# Patient Record
Sex: MALE | Race: BLACK OR AFRICAN AMERICAN | NOT HISPANIC OR LATINO | Employment: FULL TIME | URBAN - METROPOLITAN AREA
[De-identification: names, ages, dates, MRNs, and addresses within clinical notes are randomized per-mention and may not be internally consistent; named-entity substitution may affect disease eponyms.]

---

## 2018-07-25 ENCOUNTER — OFFICE VISIT (OUTPATIENT)
Dept: URGENT CARE | Facility: URGENT CARE | Age: 23
End: 2018-07-25
Payer: MEDICAID

## 2018-07-25 ENCOUNTER — HOSPITAL ENCOUNTER (EMERGENCY)
Facility: CLINIC | Age: 23
Discharge: LEFT BEFORE DISCHARGE | End: 2018-07-25
Payer: MEDICAID

## 2018-07-25 ENCOUNTER — RADIANT APPOINTMENT (OUTPATIENT)
Dept: GENERAL RADIOLOGY | Facility: CLINIC | Age: 23
End: 2018-07-25
Attending: FAMILY MEDICINE
Payer: MEDICAID

## 2018-07-25 VITALS
HEART RATE: 55 BPM | DIASTOLIC BLOOD PRESSURE: 78 MMHG | OXYGEN SATURATION: 99 % | TEMPERATURE: 98.2 F | SYSTOLIC BLOOD PRESSURE: 130 MMHG

## 2018-07-25 DIAGNOSIS — S59.902A ELBOW INJURY, LEFT, INITIAL ENCOUNTER: Primary | ICD-10-CM

## 2018-07-25 DIAGNOSIS — S52.125A CLOSED NONDISPLACED FRACTURE OF HEAD OF LEFT RADIUS, INITIAL ENCOUNTER: ICD-10-CM

## 2018-07-25 PROCEDURE — 99203 OFFICE O/P NEW LOW 30 MIN: CPT | Performed by: FAMILY MEDICINE

## 2018-07-25 PROCEDURE — 73080 X-RAY EXAM OF ELBOW: CPT | Mod: LT

## 2018-07-25 NOTE — PROGRESS NOTES
SUBJECTIVE:  Chief Complaint   Patient presents with     Urgent Care     Elbow left     left elbow injury on monday,pain getting worse     John Monteiro is a 22 year old male presents with a chief complaint of left elbow pain, tenderness and decreased range of motion.  The injury occurred 2 day(s) ago.   The injury happened while skateboarding. How: fall immediate pain, delayed pain.  The patient complained of moderate pain  and has had decreased ROM.  Pain exacerbated by flexion/extension.  Relieved by rest.  He treated it initially with ice, Tylenol and Ibuprofen. This is not the first time this type of injury has occurred to this patient.     Past Medical History:   Diagnosis Date     Appendicitis Sep 19, 2009     Pneumonia April 16, 2009     Current Outpatient Prescriptions   Medication Sig Dispense Refill     cholecalciferol (VITAMIN D3) 5000 UNITS CAPS capsule Take 1 capsule by mouth daily. 30 capsule 11     LANsoprazole (PREVACID) 15 MG capsule Take 1 capsule by mouth daily. Take 30-60 minutes before a meal. (Patient not taking: Reported on 7/25/2018) 30 capsule 1     NO ACTIVE MEDICATIONS         Social History   Substance Use Topics     Smoking status: Never Smoker     Smokeless tobacco: Never Used     Alcohol use No       ROS:  Review of systems negative except as stated above.    EXAM:   /78  Pulse 55  Temp 98.2  F (36.8  C) (Oral)  SpO2 99%  Gen: healthy,alert,no distress  Extremity: elbow has swelling, point tenderness on the olecranon , decreased ROM and pain with flexion and extension, supination and pronation   There is not compromise to the distal circulation.  Pulses are +2 and CRT is brisk  GENERAL APPEARANCE: healthy, alert and no distress  EXTREMITIES: peripheral pulses normal  SKIN: no suspicious lesions or rashes  NEURO: Normal strength and tone, sensory exam grossly normal, mentation intact and speech normal    X-RAY was done.    Results for orders placed or performed in visit on  07/25/18   XR Elbow Left G/E 3 Views    Narrative    XR ELBOW LT G/E 3 VW   7/25/2018 6:16 PM     HISTORY: fell on elbow 2 days back , acute pain with flexion  extension, supination and pronation , tenderness over the olecranon;  Elbow injury, left, initial encounter    COMPARISON: None.      Impression    IMPRESSION: There is a large joint effusion. On the oblique view would  be difficult to exclude a tiny fracture at the radial head neck  junction but this could also just be a vascular channel. No other  definite fractures identified. The joint effusion suggests an occult  fracture. This should be assumed to be a fracture until proven  otherwise. Follow-up x-ray in 7-10 days is recommended. Alternatively  MRI could be performed.    RICHY HOLLEY MD     ASSESSMENT:   John was seen today for urgent care and elbow left.    Diagnoses and all orders for this visit:    Elbow injury, left, initial encounter  -     Cancel: XR Elbow Left 2 Views  -     XR Elbow Left G/E 3 Views    Closed nondisplaced fracture of head of left radius, initial encounter      PLAN:  1) Ibuprofen q 6 hrs for 3-5 days, Ortho referral   Rest ice elevate  Elbow was put in a sling and then he was advised to follow up with ortho referral

## 2018-07-25 NOTE — MR AVS SNAPSHOT
After Visit Summary   7/25/2018    John Monteiro    MRN: [id number]           Patient Information     Date Of Birth          1995        Visit Information        Provider Department      7/25/2018 5:35 PM Lupis Dickey MD [facility]        Today's Diagnoses     Elbow injury, left, initial encounter    -  1    Closed nondisplaced fracture of head of left radius, initial encounter          Care Instructions      Elbow Fracture    You have a break (fracture) of one or more bones of your elbow joint. This may be a small crack in the bone. Or it may be a major break, with the broken parts pushed out of position.  This fracture usually takes 4 to 12 weeks to heal, depending on the type. The first step in treatment is with a splint or cast. Severe fractures may need surgery to put the bone fragments back into place.  Home care  Follow these guidelines when caring for yourself at home:    Keep your arm elevated to reduce pain and swelling. When sitting or lying down keep your arm above the level of your heart. You can do this by placing your arm on a pillow that rests on your chest or on a pillow at your side. This is most important during the first 2 days (48 hours) after the injury.    Put an ice pack on the injured area. Do this for 20 minutes every 1 to 2 hours the first day. You can make an ice pack by wrapping a plastic bag of ice cubes in a thin towel. As the ice melts, be careful that the cast or splint doesn t get wet. You can place the ice pack inside the sling and directly over the splint or cast. Continue to use the ice pack 3 to 4 times a day for the next 2 days. Then use the ice pack as needed to ease pain and swelling.    Keep the splint or cast completely dry at all times. Bathe with your splint or cast out of the water. Protect it with a large plastic bag, rubber-banded at the top end. If a fiberglass splint or cast gets wet, you can dry it with a hair  dryer.    You may use acetaminophen or ibuprofen to control pain, unless another pain medicine was prescribed. If you have chronic liver or kidney disease, talk with your healthcare provider before using these medicines. Also talk with your provider if you ve had a stomach ulcer or gastrointestinal bleeding.    Don t put creams or objects under the cast if you have itching.  Follow-up care  Follow up with your healthcare provider in 1 week, or as advised. This is to make sure the bone is healing the way it should. If a splint was put on, it may be changed to a cast during your follow-up visit.  X-rays may be taken. You will be told of any new findings that may affect your care.  When to seek medical advice  Call your healthcare provider right away if any of these occur:    The cast or splint cracks    The plaster cast or splint becomes wet or soft    The fiberglass cast or splint stays wet for more than 24 hours    Tightness or pain under the cast or splint gets worse    Bad odor from the cast or wound fluid stains the cast    Fingers become swollen, cold, blue, numb, or tingly    You can t move your fingers    Skin around cast becomes red    Fever of 100.4 F (38 C) or higher, or as directed by your healthcare provider   Date Last Reviewed: 2/6/2017 2000-2017 [employer]. [address] [place] [address], [place] [address]. All rights reserved. This information is not intended as a substitute for professional medical care. Always follow your healthcare professional's instructions.                Follow-ups after your visit        Who to contact     If you have questions or need follow up information about today's clinic visit or your schedule please contact [facility] directly at [phone].  Normal or non-critical lab and imaging results will be communicated to you by MyChart, letter or phone within 4 business days after the clinic has received the results. If you do not hear from  "us within 7 days, please contact the clinic through [email or web address] or phone. If you have a critical or abnormal lab result, we will notify you by phone as soon as possible.  Submit refill requests through [email or web address] or call your pharmacy and they will forward the refill request to us. Please allow 3 business days for your refill to be completed.          Additional Information About Your Visit        [employer] Information     [email or web address] lets you send messages to your doctor, view your test results, renew your prescriptions, schedule appointments and more. To sign up, go to [email or web address] . Click on \"Log in\" on the left side of the screen, which will take you to the Welcome page. Then click on \"Sign up Now\" on the right side of the page.     You will be asked to enter the access code listed below, as well as some personal information. Please follow the directions to create your username and password.     Your access code is: [email or web address]  Expires: 10/23/2018  6:42 PM     Your access code will  in 90 days. If you need help or a new code, please call your [facility] clinic or [phone].        Care EveryWhere ID     This is your Care EveryWhere ID. This could be used by other organizations to access your [facility] medical records  [id number]        Your Vitals Were     Pulse Temperature Pulse Oximetry             55 98.2  F (36.8  C) (Oral) 99%          Blood Pressure from Last 3 Encounters:   18 130/78   14 145/80   14 104/63    Weight from Last 3 Encounters:   14 183 lb (83 kg) (89 %)*   14 189 lb 6.4 oz (85.9 kg) (92 %)*   13 180 lb (81.6 kg) (92 %)*     * Growth percentiles are based on CDC 2-20 Years data.              We Performed the Following     XR Elbow Left G/E 3 Views        Primary Care Provider Office Phone # Fax #    [facility] [phone] [address] [facility] [address]        Equal Access to Services     " JOAN REDDING : Hadii aad ku cyndi [place], waaxda luqadaha, qaybta kaalmada carrillosarkisanjelica, waxyanelis deny willisjellytatiana hein . So [facility] [phone].    ATENCIÓN: Si habla español, tiene a lind disposición servicios gratuitos de asistencia lingüística. Llame al [phone].    We comply with applicable federal civil rights laws and [place] laws. We do not discriminate on the basis of race, color, national origin, age, disability, sex, sexual orientation, or gender identity.            Thank you!     Thank you for choosing [facility]  for your care. Our goal is always to provide you with excellent care. Hearing back from our patients is one way we can continue to improve our services. Please take a few minutes to complete the written survey that you may receive in the mail after your visit with us. Thank you!             Your Updated Medication List - Protect others around you: Learn how to safely use, store and throw away your medicines at www.disposemymeds.org.          This list is accurate as of 7/25/18  6:42 PM.  Always use your most recent med list.                   Brand Name Dispense Instructions for use Diagnosis    cholecalciferol 5000 units Caps capsule    vitamin D3    30 capsule    Take 1 capsule by mouth daily.    Vitamin D deficiency       LANsoprazole 15 MG CR capsule    PREVACID    30 capsule    Take 1 capsule by mouth daily. Take 30-60 minutes before a meal.    Epigastric pain       NO ACTIVE MEDICATIONS

## 2018-07-25 NOTE — PATIENT INSTRUCTIONS
Elbow Fracture    You have a break (fracture) of one or more bones of your elbow joint. This may be a small crack in the bone. Or it may be a major break, with the broken parts pushed out of position.  This fracture usually takes 4 to 12 weeks to heal, depending on the type. The first step in treatment is with a splint or cast. Severe fractures may need surgery to put the bone fragments back into place.  Home care  Follow these guidelines when caring for yourself at home:    Keep your arm elevated to reduce pain and swelling. When sitting or lying down keep your arm above the level of your heart. You can do this by placing your arm on a pillow that rests on your chest or on a pillow at your side. This is most important during the first 2 days (48 hours) after the injury.    Put an ice pack on the injured area. Do this for 20 minutes every 1 to 2 hours the first day. You can make an ice pack by wrapping a plastic bag of ice cubes in a thin towel. As the ice melts, be careful that the cast or splint doesn t get wet. You can place the ice pack inside the sling and directly over the splint or cast. Continue to use the ice pack 3 to 4 times a day for the next 2 days. Then use the ice pack as needed to ease pain and swelling.    Keep the splint or cast completely dry at all times. Bathe with your splint or cast out of the water. Protect it with a large plastic bag, rubber-banded at the top end. If a fiberglass splint or cast gets wet, you can dry it with a hair dryer.    You may use acetaminophen or ibuprofen to control pain, unless another pain medicine was prescribed. If you have chronic liver or kidney disease, talk with your healthcare provider before using these medicines. Also talk with your provider if you ve had a stomach ulcer or gastrointestinal bleeding.    Don t put creams or objects under the cast if you have itching.  Follow-up care  Follow up with your healthcare provider in 1 week, or as advised. This is  to make sure the bone is healing the way it should. If a splint was put on, it may be changed to a cast during your follow-up visit.  X-rays may be taken. You will be told of any new findings that may affect your care.  When to seek medical advice  Call your healthcare provider right away if any of these occur:    The cast or splint cracks    The plaster cast or splint becomes wet or soft    The fiberglass cast or splint stays wet for more than 24 hours    Tightness or pain under the cast or splint gets worse    Bad odor from the cast or wound fluid stains the cast    Fingers become swollen, cold, blue, numb, or tingly    You can t move your fingers    Skin around cast becomes red    Fever of 100.4 F (38 C) or higher, or as directed by your healthcare provider   Date Last Reviewed: 2/6/2017 2000-2017 [employer]. [address] [place] [address]. All rights reserved. This information is not intended as a substitute for professional medical care. Always follow your healthcare professional's instructions.

## 2020-01-30 ENCOUNTER — HOSPITAL ENCOUNTER (EMERGENCY)
Facility: CLINIC | Age: 25
Discharge: HOME | End: 2020-01-30
Attending: EMERGENCY MEDICINE | Admitting: EMERGENCY MEDICINE
Payer: COMMERCIAL

## 2020-01-30 VITALS
SYSTOLIC BLOOD PRESSURE: 128 MMHG | RESPIRATION RATE: 16 BRPM | DIASTOLIC BLOOD PRESSURE: 70 MMHG | BODY MASS INDEX: 26.79 KG/M2 | WEIGHT: 208.78 LBS | HEART RATE: 91 BPM | OXYGEN SATURATION: 98 % | HEIGHT: 74 IN | TEMPERATURE: 98.6 F

## 2020-01-30 DIAGNOSIS — J20.9 ACUTE BRONCHITIS, UNSPECIFIED ORGANISM: ICD-10-CM

## 2020-01-30 LAB
FLUAV+FLUBV AG SPEC QL: NEGATIVE
FLUAV+FLUBV AG SPEC QL: NEGATIVE
SPECIMEN SOURCE: NORMAL

## 2020-01-30 PROCEDURE — 87804 INFLUENZA ASSAY W/OPTIC: CPT | Performed by: EMERGENCY MEDICINE

## 2020-01-30 PROCEDURE — 99283 EMERGENCY DEPT VISIT LOW MDM: CPT | Performed by: EMERGENCY MEDICINE

## 2020-01-30 PROCEDURE — 99283 EMERGENCY DEPT VISIT LOW MDM: CPT | Mod: Z6 | Performed by: EMERGENCY MEDICINE

## 2020-01-30 RX ORDER — BENZONATATE 100 MG/1
100-200 CAPSULE ORAL 3 TIMES DAILY PRN
Qty: 30 CAPSULE | Refills: 0 | Status: SHIPPED | OUTPATIENT
Start: 2020-01-30 | End: 2022-05-09

## 2020-01-30 ASSESSMENT — MIFFLIN-ST. JEOR: SCORE: 2011.75

## 2020-01-30 NOTE — LETTER
January 30, 2020      To Whom It May Concern:      John Monteiro was seen in our Emergency Department today, 01/30/20.  I expect his condition to improve over the next few days.  He may return to work/school on 2/1/2020.    Sincerely,        Yonny Loco MD, MD

## 2020-01-30 NOTE — ED AVS SNAPSHOT
[facility], [place], Emergency Department  [address] [place] [id number]  Phone:  [phone]                                    John Monteiro   MRN: [id number]    Department:  [facility], Emergency Department   Date of Visit:  1/30/2020           After Visit Summary Signature Page    I have received my discharge instructions, and my questions have been answered. I have discussed any challenges I see with this plan with the nurse or doctor.    ..........................................................................................................................................  Patient/Patient Representative Signature      ..........................................................................................................................................  Patient Representative Print Name and Relationship to Patient    ..................................................               ................................................  Date                                   Time    ..........................................................................................................................................  Reviewed by Signature/Title    ...................................................              ..............................................  Date                                               Time          22EPIC Rev 08/18

## 2020-01-30 NOTE — ED TRIAGE NOTES
Pt arrived via car with c/o fever, cough and nausea x 2 days. Per pt he has not had his flu shot this year. Vitals stable on arrival.

## 2020-01-31 NOTE — ED PROVIDER NOTES
History     Chief Complaint   Patient presents with     Fever     HPI  John Monteiro is a 24 year old male who presents to the ER with complaints of a cough myalgias and fevers over the last 2 days.  Patient states his cough is productive of greenish type sputum.  Patient is a non-smoker he denies any vomiting or diarrhea.  Patient denies any chest pain or shortness of breath.    I have reviewed the Medications, Allergies, Past Medical and Surgical History, and Social History in the [employer] system.  Past Medical History:   Diagnosis Date     Appendicitis Sep 19, 2009     Pneumonia April 16, 2009     Current Outpatient Medications   Medication Sig Dispense Refill     cholecalciferol (VITAMIN D3) 5000 UNITS CAPS capsule Take 1 capsule by mouth daily. 30 capsule 11     LANsoprazole (PREVACID) 15 MG capsule Take 1 capsule by mouth daily. Take 30-60 minutes before a meal. (Patient not taking: Reported on 7/25/2018) 30 capsule 1     NO ACTIVE MEDICATIONS         order for DME Equipment being ordered: left arm sling 1 Device 0     No Known Allergies     Social History     Socioeconomic History     Marital status: Single     Spouse name: Not on file     Number of children: Not on file     Years of education: Not on file     Highest education level: Not on file   Occupational History     Not on file   Social Needs     Financial resource strain: Not on file     Food insecurity:     Worry: Not on file     Inability: Not on file     Transportation needs:     Medical: Not on file     Non-medical: Not on file   Tobacco Use     Smoking status: Never Smoker     Smokeless tobacco: Never Used   Substance and Sexual Activity     Alcohol use: No     Drug use: No     Sexual activity: Never   Lifestyle     Physical activity:     Days per week: Not on file     Minutes per session: Not on file     Stress: Not on file   Relationships     Social connections:     Talks on phone: Not on file     Gets together: Not on file     Attends  "Gnosticist service: Not on file     Active member of club or organization: Not on file     Attends meetings of clubs or organizations: Not on file     Relationship status: Not on file     Intimate partner violence:     Fear of current or ex partner: Not on file     Emotionally abused: Not on file     Physically abused: Not on file     Forced sexual activity: Not on file   Other Topics Concern     Not on file   Social History Narrative     Not on file     Past Surgical History:   Procedure Laterality Date     APPENDECTOMY  Sep 19, 2009    laparoscopic     Family History   Problem Relation Age of Onset     Lipids Mother      Family History Negative Father        Review of Systems   All other systems reviewed and are negative.      Physical Exam   BP: 134/67  Heart Rate: 101  Temp: 98.6  F (37  C)  Resp: 16  Height: 188 cm (6' 2\")  Weight: 94.7 kg (208 lb 12.4 oz)  SpO2: 97 %      Physical Exam  Vitals signs and nursing note reviewed.   HENT:      Head: Atraumatic.      Mouth/Throat:      Pharynx: No oropharyngeal exudate or posterior oropharyngeal erythema.   Eyes:      Extraocular Movements: Extraocular movements intact.      Pupils: Pupils are equal, round, and reactive to light.   Neck:      Musculoskeletal: Neck supple.   Cardiovascular:      Rate and Rhythm: Regular rhythm.      Heart sounds: Normal heart sounds.   Pulmonary:      Breath sounds: Normal breath sounds.   Musculoskeletal:         General: No swelling.   Lymphadenopathy:      Cervical: No cervical adenopathy.   Neurological:      General: No focal deficit present.      Mental Status: He is alert and oriented to person, place, and time.   Psychiatric:         Mood and Affect: Mood normal.         ED Course        Procedures        Results for orders placed or performed during the hospital encounter of 01/30/20 (from the past 24 hour(s))   Influenza A/B antigen   Result Value Ref Range    Influenza A/B Agn Specimen Nares     Influenza A Negative " NEG^Negative    Influenza B Negative NEG^Negative       Labs Ordered and Resulted from Time of ED Arrival Up to the Time of Departure from the ED   INFLUENZA A/B ANTIGEN         Assessments & Plan (with Medical Decision Making)     I have reviewed the nursing notes.    Patient symptoms seem to be mostly viral in etiology and do not require antibiotic treatment at this time.    I have reviewed the findings, diagnosis, plan and need for follow up with the patient.    New Prescriptions    BENZONATATE (TESSALON) 100 MG CAPSULE    Take 1-2 capsules (100-200 mg) by mouth 3 times daily as needed for cough       Final diagnoses:   Acute bronchitis, unspecified organism     Keep hydrated.    Please make an appointment to follow up with Your Primary Care Provider or our [facility] (phone: [phone]) in 5-7 days if not improving.    Return to the ER for worsening.    Patient was given a work note through 2/1/2020.    Routine discharge instructions were given for this diagnosis.    Yonny Loco MD, MD    1/30/2020   [facility], EMERGENCY DEPARTMENT     Yonny Loco MD  01/30/20 0410

## 2020-01-31 NOTE — DISCHARGE INSTRUCTIONS
Keep hydrated.    Please make an appointment to follow up with Your Primary Care Provider or our [facility] (phone: [phone]) in 5-7 days if not improving.    Return to the ER for worsening.

## 2020-09-24 ENCOUNTER — OFFICE VISIT (OUTPATIENT)
Dept: FAMILY MEDICINE | Facility: CLINIC | Age: 25
End: 2020-09-24
Payer: COMMERCIAL

## 2020-09-24 VITALS
SYSTOLIC BLOOD PRESSURE: 129 MMHG | HEART RATE: 74 BPM | DIASTOLIC BLOOD PRESSURE: 88 MMHG | TEMPERATURE: 98.3 F | OXYGEN SATURATION: 99 % | HEIGHT: 74 IN | WEIGHT: 233.4 LBS | RESPIRATION RATE: 18 BRPM | BODY MASS INDEX: 29.95 KG/M2

## 2020-09-24 DIAGNOSIS — J30.2 SEASONAL ALLERGIC RHINITIS, UNSPECIFIED TRIGGER: ICD-10-CM

## 2020-09-24 DIAGNOSIS — F90.9 ATTENTION DEFICIT HYPERACTIVITY DISORDER (ADHD), UNSPECIFIED ADHD TYPE: ICD-10-CM

## 2020-09-24 DIAGNOSIS — Z23 NEED FOR PROPHYLACTIC VACCINATION AND INOCULATION AGAINST INFLUENZA: Primary | ICD-10-CM

## 2020-09-24 DIAGNOSIS — Z00.00 ANNUAL PHYSICAL EXAM: ICD-10-CM

## 2020-09-24 LAB — HIV 1+2 AB+HIV1 P24 AG SERPL QL IA: NONREACTIVE

## 2020-09-24 RX ORDER — CETIRIZINE HYDROCHLORIDE 10 MG/1
10 TABLET ORAL DAILY
Qty: 30 TABLET | Refills: 3 | Status: ON HOLD | OUTPATIENT
Start: 2020-09-24 | End: 2023-11-29

## 2020-09-24 SDOH — HEALTH STABILITY: PHYSICAL HEALTH: ON AVERAGE, HOW MANY DAYS PER WEEK DO YOU ENGAGE IN MODERATE TO STRENUOUS EXERCISE (LIKE A BRISK WALK)?: 1 DAY

## 2020-09-24 ASSESSMENT — MIFFLIN-ST. JEOR: SCORE: 2118.45

## 2020-09-24 NOTE — PATIENT INSTRUCTIONS
Here is the plan from today's visit    You are doing all the appropriate maintenance, eye doctor and dentist. Continue to gently increase your exercise activity, wear the foot support and ice/ibuprofen/rest as needed. Congratulations on your imminent graduation.    1. Need for prophylactic vaccination and inoculation against influenza  Happy to give it today.   - INFLUENZA VACCINE IM > 6 MONTHS VALENT IIV4 [64079]    2. Seasonal allergic rhinitis, unspecified trigger  With your increase in allergy symptom severity, I agree it is reasonable to get tested to see if we can identify triggers to avoid. It may also open up treatment options like allergy shots if we decide to go that route. If you start getting more symptoms in your chest, wheezing, shortness of breath, let me know please.  - ALLERGY/ASTHMA ADULT REFERRAL - INTERNAL; Future  - cetirizine (ZYRTEC) 10 MG tablet; Take 1 tablet (10 mg) by mouth daily  Dispense: 30 tablet; Refill: 3    3. Attention deficit hyperactivity disorder (ADHD), unspecified ADHD type  I'm happy to take over writing for this when the BRAD comes through. If you also sign up for AMDL I can message you when that happens. Otherwise, I'll call you.    Please call or return to clinic if your symptoms don't go away.    Follow up plan  Go to allergy testing, I will get results and we will go from there  Reach out to us with any concerns    Thank you for coming to [facility] today.  Lab Testing:  **If you had lab testing today and your results are reassuring or normal they will be mailed to you or sent through AMDL within 7 days.   **If the lab tests need quick action we will call you with the results.  The phone number we will call with results is # [phone] (home) . If this is not the best number please call our clinic and change the number.  Medication Refills:  If you need any refills please call your pharmacy and they will contact us.   If you need to  your refill at a  new pharmacy, please contact the new pharmacy directly. The new pharmacy will help you get your medications transferred faster.   Scheduling:  If you have any concerns about today's visit or wish to schedule another appointment please call our office during normal business hours [phone] (8-5:00 M-F)  If a referral was made to a [facility] and you don't get a call from central scheduling please call [phone].    Medical Concerns:  If you have urgent medical concerns please call [phone] at any time of the day.    Etta Willis MD

## 2020-09-24 NOTE — PROGRESS NOTES
[facility] visit    Assessment and Plan     John is a 25 year old male who presents with: Establish Care (Routine check up and allergy check); A.D.H.D (Patient would like to discuss ADHD, has been treated for in the past, but is switching care to [facility]. ); and Imm/Inj (Flu Shot)      John was seen today for establish care, a.d.h.d and imm/inj.    Diagnoses and all orders for this visit:    Establishing care  Need for prophylactic vaccination and inoculation against influenza  Patient establishing care. Generally healthy with exceptions below. Has history of ankle sprain last year (not seen in clinic) that occasionally aches with resumption of physical activity after COVID pause. We discussed perhaps a support brace while reconditioning, ibuprofen and ice as needed. He will let us know if this becomes more bothersome. Will double check vaccination from [facility].  -     INFLUENZA VACCINE IM > 6 MONTHS VALENT IIV4 [25753]    Seasonal allergic rhinitis, unspecified trigger  Patient reports having needed an inhaler when a kid, not sure why. Since childhood has had allergic rhinitis, eye crusting, from spring to fall managed with home zyrtec. He reports worsening of his symptoms over the last few years, had an episode of bronchitis that started like his allergies and is noticing more regular chest congestion. His use of zyrtec to control his symptoms has also increased markedly. No wheeze today, conjunctivae clear and no congestion right now. With escalation of symptoms, it is reasonable to have allergy testing to see if he would qualify for allergy shots. PFTs or PRN inhaler not indicated at this time. If his respiratory symptoms markedly worsen he will contact us.  -     Cancel: OFFICE CONSULTATION,LEVEL III  -     cetirizine (ZYRTEC) 10 MG tablet; Take 1 tablet (10 mg) by mouth daily  -     ALLERGY/ASTHMA ADULT REFERRAL - INTERNAL; Future    Attention deficit hyperactivity disorder (ADHD), unspecified ADHD  type  Reports history of ADHD diagnosed in middle school with poor medication tolerance at that time. He reports starting meds two years ago with increasing college demands,  reviewed and patient has single prescriber with no atypical refill pattern. Reports taking when needed for work or school, have requested BRAD. Am content to take over writing for his adderall, 20mg daily prn once records obtained. He reports having at least two weeks supply at home.       No follow-ups on file.    Options for treatment and follow-up care were reviewed with the patient/caregivers. They engaged in the decision making process and verbalized understanding of the options discussed and agreed with the final plan.    There are no discontinued medications.    JEFF Robbins MD PG1      Subjective      History obtained from patient, BRAD pending  Patient speaks English,  was not present for this visit.      John Monteiro is a 25 year old male, who presents with:  Chief Complaint   Patient presents with     [facility]     Routine check up and allergy check     A.D.H.D     Patient would like to discuss ADHD, has been treated for in the past, but is switching care to [facility].      Imm/Inj     Flu Shot     Patient is a fairly healthy 25 year old gentleman presenting to [facility]. He notes chronic seasonal allergies that have been getting a bit worse over the past few years. He remembers using an inhaler when a kid but not exactly why or what, but prior to last year denies any significant respiratory component to his allergies but has had more congestion and persistent coughing due to congesting in his chest in the past two years. He was wondering about allergy testing; reports some allergies inside, and known ragweed, denies food allergies, notes more persistent symptoms not responding as well to zyrtec. He reports being diagnosed with ADHD first in middle school, however he did not tolerate the medication  (ritalin) at that time. Two years ago he re-started medication (Adderall) that he uses when he needs to focus at work or school and notes that this has been very helpful. He also manages his symptoms with eating well and exercising which he has been doing less since COVID. He rolled his ankle a year ago and still has some soreness with increased use, inconsistent, without instability. He has a dentist, an eye doctor, both of whom he sees yearly.    Patient is a new patient to this clinic and so I reviewed and updated the problem, medication and allergy lists, as well as past, surgical, family and social history.      Patient Active Problem List   Diagnosis Code     NO ACTIVE PROBLEMS      Abdominal pain R10.9     Current Outpatient Medications   Medication     benzonatate (TESSALON) 100 MG capsule     cetirizine (ZYRTEC) 10 MG tablet     cholecalciferol (VITAMIN D3) 5000 UNITS CAPS capsule     LANsoprazole (PREVACID) 15 MG capsule     NO ACTIVE MEDICATIONS     order for DME     No current facility-administered medications for this visit.         Allergies   Allergen Reactions     Seasonal Allergies        Past Medical History:   Diagnosis Date     Appendicitis Sep 19, 2009     Pneumonia April 16, 2009     Past Surgical History:   Procedure Laterality Date     APPENDECTOMY  Sep 19, 2009     Family History     Problem (# of Occurrences) Relation (Name,Age of Onset)    Family History Negative (1) Father    Lipids (1) Mother        Social History     Socioeconomic History     Marital status: Single     Spouse name: None     Number of children: None     Years of education: None     Highest education level: None   Occupational History     None   Social Needs     Financial resource strain: None     Food insecurity     Worry: None     Inability: None     Transportation needs     Medical: None     Non-medical: None   Tobacco Use     Smoking status: Never Smoker     Smokeless tobacco: Never Used   Substance and Sexual  "Activity     Alcohol use: No     Drug use: No     Sexual activity: Never   Lifestyle     Physical activity     Days per week: None     Minutes per session: None     Stress: None   Relationships     Social connections     Talks on phone: None     Gets together: None     Attends Church service: None     Active member of club or organization: None     Attends meetings of clubs or organizations: None     Relationship status: None     Intimate partner violence     Fear of current or ex partner: None     Emotionally abused: None     Physically abused: None     Forced sexual activity: None   Other Topics Concern     None   Social History Narrative     None        ROS  7-point ROS negative except as described above.      Objective     Vital signs  /88 (BP Location: Left arm, Patient Position: Sitting, Cuff Size: Adult Large)   Pulse 74   Temp 98.3  F (36.8  C) (Oral)   Resp 18   Ht 1.88 m (6' 2\")   Wt 105.9 kg (233 lb 6.4 oz)   SpO2 99%   BMI 29.97 kg/m      Vitals were reviewed and were normal    PE  Physical Exam  Vitals signs and nursing note reviewed.   Constitutional:       Appearance: Normal appearance. He is normal weight.   HENT:      Head: Normocephalic.      Ears:      Comments: Much cerumen bilaterally.     Nose: Nose normal. No rhinorrhea.      Mouth/Throat:      Mouth: Mucous membranes are moist.      Pharynx: Oropharynx is clear. No oropharyngeal exudate or posterior oropharyngeal erythema.   Eyes:      Extraocular Movements: Extraocular movements intact.      Conjunctiva/sclera: Conjunctivae normal.   Cardiovascular:      Rate and Rhythm: Normal rate and regular rhythm.      Pulses: Normal pulses.   Pulmonary:      Effort: Pulmonary effort is normal. No respiratory distress.      Breath sounds: Normal breath sounds. No wheezing.   Abdominal:      General: Abdomen is flat. There is no distension.      Palpations: Abdomen is soft.      Tenderness: There is no abdominal tenderness. "   Musculoskeletal:         General: No tenderness.      Right lower leg: No edema.      Left lower leg: No edema.   Skin:     General: Skin is warm and dry.   Neurological:      General: No focal deficit present.      Mental Status: He is alert and oriented to person, place, and time.      Cranial Nerves: No cranial nerve deficit.      Coordination: Coordination normal.      Gait: Gait normal.   Psychiatric:         Mood and Affect: Mood normal.         Behavior: Behavior normal.         Thought Content: Thought content normal.         Judgment: Judgment normal.           Results    Results from this or last visitNo results found for any visits on 09/24/20.

## 2020-09-25 NOTE — PROGRESS NOTES
Preceptor Attestation:   Patient seen, evaluated and discussed with the resident. I have verified the content of the note, which accurately reflects my assessment of the patient and the plan of care.   Supervising Physician:  Gisele Recinos, DO

## 2020-11-22 ENCOUNTER — HEALTH MAINTENANCE LETTER (OUTPATIENT)
Age: 25
End: 2020-11-22

## 2021-05-19 ENCOUNTER — IMMUNIZATION (OUTPATIENT)
Dept: NURSING | Facility: CLINIC | Age: 26
End: 2021-05-19
Payer: COMMERCIAL

## 2021-05-19 PROCEDURE — 91300 PR COVID VAC PFIZER DIL RECON 30 MCG/0.3 ML IM: CPT

## 2021-05-19 PROCEDURE — 0001A PR COVID VAC PFIZER DIL RECON 30 MCG/0.3 ML IM: CPT

## 2021-06-09 ENCOUNTER — IMMUNIZATION (OUTPATIENT)
Dept: NURSING | Facility: CLINIC | Age: 26
End: 2021-06-09
Attending: INTERNAL MEDICINE
Payer: COMMERCIAL

## 2021-06-09 PROCEDURE — 0002A PR COVID VAC PFIZER DIL RECON 30 MCG/0.3 ML IM: CPT

## 2021-06-09 PROCEDURE — 91300 PR COVID VAC PFIZER DIL RECON 30 MCG/0.3 ML IM: CPT

## 2021-09-19 ENCOUNTER — HEALTH MAINTENANCE LETTER (OUTPATIENT)
Age: 26
End: 2021-09-19

## 2021-10-23 ENCOUNTER — HOSPITAL ENCOUNTER (EMERGENCY)
Facility: CLINIC | Age: 26
Discharge: HOME | End: 2021-10-24
Attending: EMERGENCY MEDICINE | Admitting: EMERGENCY MEDICINE
Payer: COMMERCIAL

## 2021-10-23 ENCOUNTER — APPOINTMENT (OUTPATIENT)
Dept: GENERAL RADIOLOGY | Facility: CLINIC | Age: 26
End: 2021-10-23
Attending: EMERGENCY MEDICINE
Payer: COMMERCIAL

## 2021-10-23 VITALS
WEIGHT: 210 LBS | BODY MASS INDEX: 26.95 KG/M2 | RESPIRATION RATE: 20 BRPM | HEART RATE: 81 BPM | HEIGHT: 74 IN | SYSTOLIC BLOOD PRESSURE: 140 MMHG | TEMPERATURE: 98.5 F | DIASTOLIC BLOOD PRESSURE: 84 MMHG | OXYGEN SATURATION: 97 %

## 2021-10-23 DIAGNOSIS — S52.134A CLOSED NONDISPLACED FRACTURE OF NECK OF RIGHT RADIUS, INITIAL ENCOUNTER: ICD-10-CM

## 2021-10-23 PROCEDURE — 73070 X-RAY EXAM OF ELBOW: CPT | Mod: RT

## 2021-10-23 PROCEDURE — 99283 EMERGENCY DEPT VISIT LOW MDM: CPT | Mod: 25

## 2021-10-23 PROCEDURE — 24650 CLTX RDL HEAD/NCK FX WO MNPJ: CPT | Mod: 54 | Performed by: EMERGENCY MEDICINE

## 2021-10-23 PROCEDURE — 73070 X-RAY EXAM OF ELBOW: CPT | Mod: 26 | Performed by: RADIOLOGY

## 2021-10-23 PROCEDURE — 99283 EMERGENCY DEPT VISIT LOW MDM: CPT | Mod: 57 | Performed by: EMERGENCY MEDICINE

## 2021-10-23 PROCEDURE — 24650 CLTX RDL HEAD/NCK FX WO MNPJ: CPT

## 2021-10-23 RX ORDER — ACETAMINOPHEN 325 MG/1
975 TABLET ORAL ONCE
Status: COMPLETED | OUTPATIENT
Start: 2021-10-23 | End: 2021-10-24

## 2021-10-23 RX ORDER — IBUPROFEN 600 MG/1
600 TABLET, FILM COATED ORAL ONCE
Status: DISCONTINUED | OUTPATIENT
Start: 2021-10-23 | End: 2021-10-24 | Stop reason: HOSPADM

## 2021-10-23 ASSESSMENT — ENCOUNTER SYMPTOMS
RESPIRATORY NEGATIVE: 1
CARDIOVASCULAR NEGATIVE: 1
CONSTITUTIONAL NEGATIVE: 1
JOINT SWELLING: 1
MYALGIAS: 1
NECK PAIN: 0
NECK STIFFNESS: 0
ARTHRALGIAS: 1

## 2021-10-23 ASSESSMENT — MIFFLIN-ST. JEOR: SCORE: 2007.3

## 2021-10-23 NOTE — Clinical Note
John Monteiro was seen and treated in our emergency department on 10/23/2021.  He may return to work on 10/26/2021.       If you have any questions or concerns, please don't hesitate to call.      Layo Kumar, RN

## 2021-10-24 PROCEDURE — 250N000013 HC RX MED GY IP 250 OP 250 PS 637: Performed by: EMERGENCY MEDICINE

## 2021-10-24 RX ORDER — IBUPROFEN 600 MG/1
600 TABLET, FILM COATED ORAL EVERY 8 HOURS PRN
Qty: 20 TABLET | Refills: 0 | Status: SHIPPED | OUTPATIENT
Start: 2021-10-24 | End: 2023-02-27

## 2021-10-24 RX ADMIN — ACETAMINOPHEN 975 MG: 325 TABLET, FILM COATED ORAL at 00:28
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
## 2021-10-24 NOTE — DISCHARGE INSTRUCTIONS
You have a nondisplaced fracture of the radial neck.  Wear the sling for comfort use pain medication as needed this should heal without any problems.  Follow-up with an Orthopedic Surgeon.  Please make an appointment to follow up with Orthopedics Clinic (phone: [phone]) as soon as possible.
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
## 2022-01-09 ENCOUNTER — HEALTH MAINTENANCE LETTER (OUTPATIENT)
Age: 27
End: 2022-01-09

## 2022-05-09 ENCOUNTER — VIRTUAL VISIT (OUTPATIENT)
Dept: FAMILY MEDICINE | Facility: CLINIC | Age: 27
End: 2022-05-09
Payer: COMMERCIAL

## 2022-05-09 DIAGNOSIS — H10.45 CHRONIC ALLERGIC CONJUNCTIVITIS: Primary | ICD-10-CM

## 2022-05-09 PROCEDURE — 99203 OFFICE O/P NEW LOW 30 MIN: CPT | Mod: 95 | Performed by: FAMILY MEDICINE

## 2022-05-09 ASSESSMENT — ENCOUNTER SYMPTOMS
EYE REDNESS: 1
CONSTITUTIONAL NEGATIVE: 1
EYE ITCHING: 1
RHINORRHEA: 1
GASTROINTESTINAL NEGATIVE: 1

## 2022-05-09 NOTE — PROGRESS NOTES
Sarah is a 26 year old who is being evaluated via a billable video visit.      How would you like to obtain your AVS? MyChart  If the video visit is dropped, the invitation should be resent by: Text to cell phone: [phone]  Will anyone else be joining your video visit? No      Video Start Time: 2:21 PM    Assessment and Plan    (H10.45) Chronic allergic conjunctivitis  (primary encounter diagnosis)  Comment: to try other antihistamines, nasal steroid.  Declines allergy referral today  Plan:       RTC in 1m prn    David Whitney MD      Subjective   Sarah is a 26 year old who presents for the following health issues     History of Present Illness       Reason for visit:  Seasonal Allergies.  Symptom onset:  More than a month  Symptoms include:  Runny Nose, Itchy and irritated Eyes. Nose bleeds.  Symptom intensity:  Moderate  Symptom progression:  Worsening  Had these symptoms before:  Yes  Has tried/received treatment for these symptoms:  No    He eats 0-1 servings of fruits and vegetables daily.He consumes 1 sweetened beverage(s) daily.He exercises with enough effort to increase his heart rate 20 to 29 minutes per day.  He exercises with enough effort to increase his heart rate 5 days per week.   He is taking medications regularly.     Feeling well, but has been struggling with allergies - runny nose, itchy, red eyes, sneezing.  Has tried zyrtec and zyrtec D, but not having complete resolution.  Wondering about allergy shots.  Notes that only get allergies from April-June, August-frost.  Has also tried nasal steroid without much benefit.    Review of Systems   Constitutional: Negative.    HENT: Positive for rhinorrhea.    Eyes: Positive for redness and itching.   Gastrointestinal: Negative.             Objective    Vitals - Patient Reported  Pain Score: No Pain (0)      Vitals:  No vitals were obtained today due to virtual visit.    Physical Exam   GENERAL: Healthy, alert and no distress  EYES: Eyes grossly  normal to inspection.  No discharge or erythema, or obvious scleral/conjunctival abnormalities.  RESP: No audible wheeze, cough, or visible cyanosis.  No visible retractions or increased work of breathing.    SKIN: Visible skin clear. No significant rash, abnormal pigmentation or lesions.  NEURO: Cranial nerves grossly intact.  Mentation and speech appropriate for age.  PSYCH: Mentation appears normal, affect normal/bright, judgement and insight intact, normal speech and appearance well-groomed.                Video-Visit Details    Type of service:  Video Visit    Video End Time:2:33 PM    Originating Location (pt. Location): Home    Distant Location (provider location):  [facility]     Platform used for Video Visit: [employer]

## 2022-11-20 ENCOUNTER — HEALTH MAINTENANCE LETTER (OUTPATIENT)
Age: 27
End: 2022-11-20

## 2023-02-27 ENCOUNTER — OFFICE VISIT (OUTPATIENT)
Dept: ALLERGY | Facility: CLINIC | Age: 28
End: 2023-02-27
Payer: COMMERCIAL

## 2023-02-27 VITALS
SYSTOLIC BLOOD PRESSURE: 165 MMHG | DIASTOLIC BLOOD PRESSURE: 102 MMHG | OXYGEN SATURATION: 98 % | BODY MASS INDEX: 26.91 KG/M2 | WEIGHT: 209.6 LBS | HEART RATE: 76 BPM | RESPIRATION RATE: 20 BRPM

## 2023-02-27 DIAGNOSIS — J30.1 SEASONAL ALLERGIC RHINITIS DUE TO POLLEN: ICD-10-CM

## 2023-02-27 DIAGNOSIS — R09.81 NASAL CONGESTION: Primary | ICD-10-CM

## 2023-02-27 DIAGNOSIS — R09.89 RUNNY NOSE: ICD-10-CM

## 2023-02-27 PROCEDURE — 95004 PERQ TESTS W/ALRGNC XTRCS: CPT | Performed by: INTERNAL MEDICINE

## 2023-02-27 PROCEDURE — 99204 OFFICE O/P NEW MOD 45 MIN: CPT | Mod: 25 | Performed by: INTERNAL MEDICINE

## 2023-02-27 RX ORDER — FINASTERIDE 1 MG/1
1 TABLET, FILM COATED ORAL DAILY
COMMUNITY
Start: 2022-01-01 | End: 2024-05-21

## 2023-02-27 RX ORDER — KETOCONAZOLE 20 MG/ML
SHAMPOO TOPICAL
COMMUNITY
Start: 2022-09-11

## 2023-02-27 ASSESSMENT — ENCOUNTER SYMPTOMS
JOINT SWELLING: 0
VOMITING: 0
ACTIVITY CHANGE: 0
COUGH: 0
FATIGUE: 0
DIARRHEA: 0
HEADACHES: 0
EYE REDNESS: 0
ADENOPATHY: 0
EYE DISCHARGE: 0
FACIAL SWELLING: 0
RHINORRHEA: 0
EYE ITCHING: 0
SHORTNESS OF BREATH: 0
ARTHRALGIAS: 0
SINUS PRESSURE: 0
NAUSEA: 0
FEVER: 0
WHEEZING: 0
MYALGIAS: 0
CHEST TIGHTNESS: 0

## 2023-02-27 NOTE — LETTER
2/27/2023         RE: John Monteiro  [address] [place] [address]        Dear Colleague,    Thank you for referring your patient, John Monteiro, to the [facility] [place]. Please see a copy of my visit note below.    John Monteiro was seen in the Allergy Clinic at [facility].    John Monteiro is a 27 year old male being seen today at the request of self in consultation for seasonal allergies.    He has symptoms of eye itching, nasal congestion and phlegm in the back of his throat both spring and fall.  The fall is the worst time of year.  Flonase was not helpful.  Zyrtec-D works well however it causes an increased heart rate.  It provides 90% improvement.  He rather not have to take the Sudafed but without the Sudafed he does not get much relief.    He has tried Allegra and Claritin and Zyrtec by itself without benefit.      Past Medical History:   Diagnosis Date     ADHD      Allergies     Spring to fall, primary eyes/sinus/nose but now some chest sx     Ankle sprain     Per patient report- R ankle roll when playing basketball in Spring 2019     Appendicitis Sep 19, 2009     Elbow fracture      Pneumonia April 16, 2009     Family History   Problem Relation Age of Onset     Lipids Mother      Family History Negative Father      Past Surgical History:   Procedure Laterality Date     APPENDECTOMY  Sep 19, 2009    laparoscopic       ENVIRONMENTAL HISTORY:   Pets inside the house include None.  Do you smoke cigarettes or other recreational drugs? No There is/are 0 smokers living in the house. The house does not have a damp basement.     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      Review of Systems   Constitutional: Negative for activity change, fatigue and fever.   HENT: Negative for congestion, dental problem, ear pain, facial swelling, nosebleeds, postnasal drip, rhinorrhea, sinus pressure and  sneezing.    Eyes: Negative for discharge, redness and itching.   Respiratory: Negative for cough, chest tightness, shortness of breath and wheezing.    Cardiovascular: Negative for chest pain.   Gastrointestinal: Negative for diarrhea, nausea and vomiting.   Musculoskeletal: Negative for arthralgias, joint swelling and myalgias.   Skin: Negative for rash.   Neurological: Negative for headaches.   Hematological: Negative for adenopathy.   Psychiatric/Behavioral: Negative for behavioral problems and self-injury.         Current Outpatient Medications:      cholecalciferol (VITAMIN D3) 25 mcg (1000 units) capsule, Take 1,000 Units by mouth, Disp: , Rfl:      finasteride (PROPECIA) 1 MG tablet, , Disp: , Rfl:      cetirizine (ZYRTEC) 10 MG tablet, Take 1 tablet (10 mg) by mouth daily, Disp: 30 tablet, Rfl: 3     ibuprofen (ADVIL/MOTRIN) 600 MG tablet, Take 1 tablet (600 mg) by mouth every 8 hours as needed for moderate pain, Disp: 20 tablet, Rfl: 0     ketoconazole (NIZORAL) 2 % external shampoo, SHAMPOO 3X PER WEEK, Disp: , Rfl:      NO ACTIVE MEDICATIONS,  , Disp: , Rfl:   Allergies   Allergen Reactions     Seasonal Allergies      Ragweed and trees         EXAM:   BP (!) 165/102   Pulse 76   Resp 20   Wt 95.1 kg (209 lb 9.6 oz)   SpO2 98%   BMI 26.91 kg/m      Physical Exam    Constitutional:       General: He is not in acute distress.     Appearance: Normal appearance. He is not ill-appearing.   HENT:      Head: Normocephalic and atraumatic.      Nose: Nose normal. No congestion or rhinorrhea.      Mouth/Throat:      Mouth: Mucous membranes are moist.      Pharynx: Oropharynx is clear. No posterior oropharyngeal erythema.   Eyes:      General:         Right eye: No discharge.         Left eye: No discharge.   Cardiovascular:      Rate and Rhythm: Normal rate and regular rhythm.      Heart sounds: Normal heart sounds.   Pulmonary:      Effort: Pulmonary effort is normal.      Breath sounds: Normal breath sounds.  No wheezing or rhonchi.   Skin:     General: Skin is warm.      Findings: No erythema or rash.   Neurological:      General: No focal deficit present.      Mental Status: He is alert. Mental status is at baseline.   Psychiatric:         Mood and Affect: Mood normal.         Behavior: Behavior normal.     WORKUP: Skin testing was positive to trees, grass and weeds.    ENVIRONMENTAL PERCUTANEOUS SKIN TESTING: ADULT  [place] [facility] 2/27/2023   Consent Y   Ordering Physician Noel   Interpreting Physician Noel   Testing Technician See HUGO RN   Location Back   Time start:  3:43 PM   Time End:  3:58 PM   Positive Control: Histatrol*ALK 1 mg/ml 6/10   Negative Control: 50% Glycerin 0   Cat Hair*ALK (10,000 BAU/ml) 0   AP Dog Hair/Dander (1:100 w/v) 0   Dust Mite p. 30,000 AU/ml 0   Dust Mite f. (30,000 AU/ml) 0   Craig (W/F in millimeters) 3/5   Don Grass (100,000 BAU/mL) 0   Red Cedar (W/F in millimeters) 20/35   Maple/[place] (W/F in millimeters) 6/15   Hackberry (W/F in millimeters) 0   [place] (W/F in millimeters) 5/10   [place] *ALK (W/F in millimeters) 0   American Elm (W/F in millimeters) 15/30   [place] (W/F in millimeters) 5/8   Black [place] (W/F in millimeters) 6/7   Birch Mix (W/F in millimeters) 0   [place] (W/F in millimeters) 0   Oak (W/F in millimeters) 0   Cocklebur (W/F in millimeters) 15/20   [place] (W/F in millimeters) 0   [place] (W/F in millimeters) 0   Careless (W/F in millimeters) 0   Nettle (W/F in millimeters) 0   English Plantain (W/F in millimeters) 5/8   Kochia (W/F in millimeters) 5/8   Lamb's Quarter (W/F in millimeters) 5/8   Marshelder (W/F in millimeters) 20/35   Ragweed Mix* ALK (W/F in millimeters) 40/60   Russian Thistle (W/F in millimeters) 5/10   Sagebrush/Mugwort (W/F in millimeters) 30/40   Sheep Sorrel (W/F in millimeters) 5/10   Feather Mix* ALK (W/F in millimeters) 0   Penicillium Mix (1:10 w/v) 0   Curvularia spicifera (1:10 w/v) 0   Epicoccum (1:10 w/v)  0   Aspergillus fumigatus (1:10 w/v): 0   Alternaria tenius (1:10 w/v) 0   H. Cladosporium (1:10 w/v) 0   Phoma herbarum (1:10 w/v) 0        ASSESSMENT/PLAN:  John Monteiro is a 27 year old male seen today for environmental allergy evaluation.  Skin test were positive.  He is having difficulty with medications causing side effects such as tachycardia with the Sudafed.  Without Sudafed he is not getting much relief.  He would like to consider allergy shots.  He is aware of the time commitment involved.    1. Skin testing was positive to trees, grass and weeds  2. Medications include Zyrtec-D as well as consideration of Pataday eyedrops.  Nasal steroids were not effective in the past.  3. Allergy shots could be considered.  This therapy would be weekly shots for approximately 24 shots followed by monthly shots for about 4 years.  4. Blood pressure is elevated today.  Would recommend home monitoring over the next few weeks at least a few times a week.  If still elevated to discuss with your primary care provider.  5. The patient was counseled regarding the time commitment, billing responsibility depending on the insurance coverage (also the insurance will be billed once allergen immunotherapy serums are compounded), auto-injectable epinephrine device policy, risks (I stated risks include hives, swelling, shortness of breath. I did state that one in 2.5 million shot administrations can result in death), and benefits of allergen immunotherapy and he wishes to call insurance.  6. We went over the informed consent that needed to be signed, agreeing to remain in the clinic for 30 minutes after the injection.      Follow-up in 3 months      Thank you for allowing me to participate in the care of John Monteiro.      I spent 40 minutes on the date of the encounter doing chart review, history and exam, documentation and further coordination as noted above exclusive of separately reported interpretations    Ton Cohen  MD  Allergy/Immunology  [facility]      Verbal consent was obtained prior to procedure by the provider. Per provider verbal order, placed Adult Environmental Panel scratch test. Once antigens were placed, patient was monitored for 15 minutes in clinic. Provider read test after 15 minutes. Patient tolerated procedure well. All questions and concerns were addressed at office visit by the provider.     See REMY RN, BSN              Again, thank you for allowing me to participate in the care of your patient.        Sincerely,        Ton Cohen MD

## 2023-02-27 NOTE — PROGRESS NOTES
John Monteiro was seen in the Allergy Clinic at [facility].    John Monteiro is a 27 year old male being seen today at the request of self in consultation for seasonal allergies.    He has symptoms of eye itching, nasal congestion and phlegm in the back of his throat both spring and fall.  The fall is the worst time of year.  Flonase was not helpful.  Zyrtec-D works well however it causes an increased heart rate.  It provides 90% improvement.  He rather not have to take the Sudafed but without the Sudafed he does not get much relief.    He has tried Allegra and Claritin and Zyrtec by itself without benefit.      Past Medical History:   Diagnosis Date     ADHD      Allergies     Spring to fall, primary eyes/sinus/nose but now some chest sx     Ankle sprain     Per patient report- R ankle roll when playing basketball in Spring 2019     Appendicitis Sep 19, 2009     Elbow fracture      Pneumonia April 16, 2009     Family History   Problem Relation Age of Onset     Lipids Mother      Family History Negative Father      Past Surgical History:   Procedure Laterality Date     APPENDECTOMY  Sep 19, 2009    laparoscopic       ENVIRONMENTAL HISTORY:   Pets inside the house include None.  Do you smoke cigarettes or other recreational drugs? No There is/are 0 smokers living in the house. The house does not have a damp basement.     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      Review of Systems   Constitutional: Negative for activity change, fatigue and fever.   HENT: Negative for congestion, dental problem, ear pain, facial swelling, nosebleeds, postnasal drip, rhinorrhea, sinus pressure and sneezing.    Eyes: Negative for discharge, redness and itching.   Respiratory: Negative for cough, chest tightness, shortness of breath and wheezing.    Cardiovascular: Negative for chest pain.   Gastrointestinal: Negative for diarrhea, nausea and vomiting.    Musculoskeletal: Negative for arthralgias, joint swelling and myalgias.   Skin: Negative for rash.   Neurological: Negative for headaches.   Hematological: Negative for adenopathy.   Psychiatric/Behavioral: Negative for behavioral problems and self-injury.         Current Outpatient Medications:      cholecalciferol (VITAMIN D3) 25 mcg (1000 units) capsule, Take 1,000 Units by mouth, Disp: , Rfl:      finasteride (PROPECIA) 1 MG tablet, , Disp: , Rfl:      cetirizine (ZYRTEC) 10 MG tablet, Take 1 tablet (10 mg) by mouth daily, Disp: 30 tablet, Rfl: 3     ibuprofen (ADVIL/MOTRIN) 600 MG tablet, Take 1 tablet (600 mg) by mouth every 8 hours as needed for moderate pain, Disp: 20 tablet, Rfl: 0     ketoconazole (NIZORAL) 2 % external shampoo, SHAMPOO 3X PER WEEK, Disp: , Rfl:      NO ACTIVE MEDICATIONS,  , Disp: , Rfl:   Allergies   Allergen Reactions     Seasonal Allergies      Ragweed and trees         EXAM:   BP (!) 165/102   Pulse 76   Resp 20   Wt 95.1 kg (209 lb 9.6 oz)   SpO2 98%   BMI 26.91 kg/m      Physical Exam    Constitutional:       General: He is not in acute distress.     Appearance: Normal appearance. He is not ill-appearing.   HENT:      Head: Normocephalic and atraumatic.      Nose: Nose normal. No congestion or rhinorrhea.      Mouth/Throat:      Mouth: Mucous membranes are moist.      Pharynx: Oropharynx is clear. No posterior oropharyngeal erythema.   Eyes:      General:         Right eye: No discharge.         Left eye: No discharge.   Cardiovascular:      Rate and Rhythm: Normal rate and regular rhythm.      Heart sounds: Normal heart sounds.   Pulmonary:      Effort: Pulmonary effort is normal.      Breath sounds: Normal breath sounds. No wheezing or rhonchi.   Skin:     General: Skin is warm.      Findings: No erythema or rash.   Neurological:      General: No focal deficit present.      Mental Status: He is alert. Mental status is at baseline.   Psychiatric:         Mood and Affect:  Mood normal.         Behavior: Behavior normal.     WORKUP: Skin testing was positive to trees, grass and weeds.    ENVIRONMENTAL PERCUTANEOUS SKIN TESTING: ADULT  [place] [facility] 2/27/2023   Consent Y   Ordering Physician Noel   Interpreting Physician Noel   Testing Technician eSe HUGO RN   Location Back   Time start:  3:43 PM   Time End:  3:58 PM   Positive Control: Histatrol*ALK 1 mg/ml 6/10   Negative Control: 50% Glycerin 0   Cat Hair*ALK (10,000 BAU/ml) 0   AP Dog Hair/Dander (1:100 w/v) 0   Dust Mite p. 30,000 AU/ml 0   Dust Mite f. (30,000 AU/ml) 0   Craig (W/F in millimeters) 3/5   Don Grass (100,000 BAU/mL) 0   Red Cedar (W/F in millimeters) 20/35   Maple/[place] (W/F in millimeters) 6/15   Hackberry (W/F in millimeters) 0   [place] (W/F in millimeters) 5/10   [place] *ALK (W/F in millimeters) 0   American Elm (W/F in millimeters) 15/30   [place] (W/F in millimeters) 5/8   Black [place] (W/F in millimeters) 6/7   Birch Mix (W/F in millimeters) 0   [place] (W/F in millimeters) 0   Oak (W/F in millimeters) 0   Cocklebur (W/F in millimeters) 15/20   [place] (W/F in millimeters) 0   [place] (W/F in millimeters) 0   Careless (W/F in millimeters) 0   Nettle (W/F in millimeters) 0   English Plantain (W/F in millimeters) 5/8   Kochia (W/F in millimeters) 5/8   Lamb's Quarter (W/F in millimeters) 5/8   Marshelder (W/F in millimeters) 20/35   Ragweed Mix* ALK (W/F in millimeters) 40/60   Russian Thistle (W/F in millimeters) 5/10   Sagebrush/Mugwort (W/F in millimeters) 30/40   Sheep Sorrel (W/F in millimeters) 5/10   Feather Mix* ALK (W/F in millimeters) 0   Penicillium Mix (1:10 w/v) 0   Curvularia spicifera (1:10 w/v) 0   Epicoccum (1:10 w/v) 0   Aspergillus fumigatus (1:10 w/v): 0   Alternaria tenius (1:10 w/v) 0   H. Cladosporium (1:10 w/v) 0   Phoma herbarum (1:10 w/v) 0        ASSESSMENT/PLAN:  John Monteiro is a 27 year old male seen today for environmental allergy evaluation.  Skin  test were positive.  He is having difficulty with medications causing side effects such as tachycardia with the Sudafed.  Without Sudafed he is not getting much relief.  He would like to consider allergy shots.  He is aware of the time commitment involved.    1. Skin testing was positive to trees, grass and weeds  2. Medications include Zyrtec-D as well as consideration of Pataday eyedrops.  Nasal steroids were not effective in the past.  3. Allergy shots could be considered.  This therapy would be weekly shots for approximately 24 shots followed by monthly shots for about 4 years.  4. Blood pressure is elevated today.  Would recommend home monitoring over the next few weeks at least a few times a week.  If still elevated to discuss with your primary care provider.  5. The patient was counseled regarding the time commitment, billing responsibility depending on the insurance coverage (also the insurance will be billed once allergen immunotherapy serums are compounded), auto-injectable epinephrine device policy, risks (I stated risks include hives, swelling, shortness of breath. I did state that one in 2.5 million shot administrations can result in death), and benefits of allergen immunotherapy and he wishes to call insurance.  6. We went over the informed consent that needed to be signed, agreeing to remain in the clinic for 30 minutes after the injection.      Follow-up in 3 months      Thank you for allowing me to participate in the care of John Monteiro.      I spent 40 minutes on the date of the encounter doing chart review, history and exam, documentation and further coordination as noted above exclusive of separately reported interpretations    Ton Cohen MD  Allergy/Immunology  [facility]

## 2023-02-27 NOTE — PROGRESS NOTES
Verbal consent was obtained prior to procedure by the provider. Per provider verbal order, placed Adult Environmental Panel scratch test. Once antigens were placed, patient was monitored for 15 minutes in clinic. Provider read test after 15 minutes. Patient tolerated procedure well. All questions and concerns were addressed at office visit by the provider.     See REMY RN, BSN

## 2023-02-27 NOTE — PATIENT INSTRUCTIONS
Skin testing was positive to trees, grass and weeds  Medications include Zyrtec-D as well as consideration of Pataday eyedrops.  Nasal steroids were not effective in the past.  Allergy shots could be considered.  This therapy would be weekly shots for approximately 24 shots followed by monthly shots for about 4 years.  Blood pressure is elevated today.  Would recommend home monitoring over the next few weeks at least a few times a week.  If still elevated to discuss with your primary care provider.  The patient was counseled regarding the time commitment, billing responsibility depending on the insurance coverage (also the insurance will be billed once allergen immunotherapy serums are compounded), auto-injectable epinephrine device policy, risks (I stated risks include hives, swelling, shortness of breath. I did state that one in 2.5 million shot administrations can result in death), and benefits of allergen immunotherapy and he wishes to call insurance.  We went over the informed consent that needed to be signed, agreeing to remain in the clinic for 30 minutes after the injection.      Allergy Staff Appt Hours Shot Hours Location       Physician   Ton Cohen MD      Support Staff   CANDIDA Arellano, CANDIDA MCKEON, AYAD FRANCIS, LPN      Mondays Tuesdays Thursdays and Fridays:  Sole 7-5 Wednesdays  Close                Mondays, Tuesdays and Fridays:  7:40 - 3:20              [facility]  [address] [facility] [address]  [place] [address]  Appt Line: [phone]    Pulmonary Function Scheduling:  [place]: [phone]           Questions about cost of your care  For questions about your cost of your visit, procedure, lab or imaging contact:  [employer] Consumer Price Line [phone] or visit:  [email or web address]      Thank you for trusting us with your care. Please feel free to contact us with any questions or concerns you may have.

## 2023-02-28 ENCOUNTER — TELEPHONE (OUTPATIENT)
Dept: ALLERGY | Facility: CLINIC | Age: 28
End: 2023-02-28

## 2023-04-15 ENCOUNTER — HEALTH MAINTENANCE LETTER (OUTPATIENT)
Age: 28
End: 2023-04-15

## 2023-04-27 DIAGNOSIS — R06.83 SNORING: Primary | ICD-10-CM

## 2023-05-02 PROBLEM — T78.40XA ALLERGIES: Status: ACTIVE | Noted: 2023-05-02

## 2023-05-02 PROBLEM — F90.2 ADHD (ATTENTION DEFICIT HYPERACTIVITY DISORDER), COMBINED TYPE: Status: ACTIVE | Noted: 2022-05-09

## 2023-05-02 PROBLEM — T78.40XA ALLERGIES: Chronic | Status: ACTIVE | Noted: 2023-05-02

## 2023-05-03 ENCOUNTER — OFFICE VISIT (OUTPATIENT)
Dept: SLEEP MEDICINE | Facility: CLINIC | Age: 28
End: 2023-05-03
Payer: COMMERCIAL

## 2023-05-03 VITALS
HEIGHT: 73 IN | OXYGEN SATURATION: 97 % | DIASTOLIC BLOOD PRESSURE: 97 MMHG | HEART RATE: 75 BPM | WEIGHT: 208.2 LBS | SYSTOLIC BLOOD PRESSURE: 155 MMHG | BODY MASS INDEX: 27.59 KG/M2

## 2023-05-03 DIAGNOSIS — G47.9 DISTURBANCE IN SLEEP BEHAVIOR: ICD-10-CM

## 2023-05-03 DIAGNOSIS — R06.83 SNORING: Primary | ICD-10-CM

## 2023-05-03 PROCEDURE — 99204 OFFICE O/P NEW MOD 45 MIN: CPT | Performed by: INTERNAL MEDICINE

## 2023-05-03 RX ORDER — ZOLPIDEM TARTRATE 5 MG/1
TABLET ORAL
Qty: 1 TABLET | Refills: 0 | Status: SHIPPED | OUTPATIENT
Start: 2023-05-03 | End: 2023-08-31

## 2023-05-03 RX ORDER — DEXTROAMPHETAMINE SACCHARATE, AMPHETAMINE ASPARTATE MONOHYDRATE, DEXTROAMPHETAMINE SULFATE AND AMPHETAMINE SULFATE 2.5; 2.5; 2.5; 2.5 MG/1; MG/1; MG/1; MG/1
10 CAPSULE, EXTENDED RELEASE ORAL DAILY
COMMUNITY
Start: 2022-10-26 | End: 2024-05-21

## 2023-05-03 ASSESSMENT — SLEEP AND FATIGUE QUESTIONNAIRES
HOW LIKELY ARE YOU TO NOD OFF OR FALL ASLEEP WHILE LYING DOWN TO REST IN THE AFTERNOON WHEN CIRCUMSTANCES PERMIT: SLIGHT CHANCE OF DOZING
HOW LIKELY ARE YOU TO NOD OFF OR FALL ASLEEP WHILE SITTING AND READING: SLIGHT CHANCE OF DOZING
HOW LIKELY ARE YOU TO NOD OFF OR FALL ASLEEP WHEN YOU ARE A PASSENGER IN A CAR FOR AN HOUR WITHOUT A BREAK: MODERATE CHANCE OF DOZING
HOW LIKELY ARE YOU TO NOD OFF OR FALL ASLEEP WHILE WATCHING TV: WOULD NEVER DOZE
HOW LIKELY ARE YOU TO NOD OFF OR FALL ASLEEP WHILE SITTING INACTIVE IN A PUBLIC PLACE: WOULD NEVER DOZE
HOW LIKELY ARE YOU TO NOD OFF OR FALL ASLEEP IN A CAR, WHILE STOPPED FOR A FEW MINUTES IN TRAFFIC: WOULD NEVER DOZE
HOW LIKELY ARE YOU TO NOD OFF OR FALL ASLEEP WHILE SITTING QUIETLY AFTER LUNCH WITHOUT ALCOHOL: WOULD NEVER DOZE
HOW LIKELY ARE YOU TO NOD OFF OR FALL ASLEEP WHILE SITTING AND TALKING TO SOMEONE: WOULD NEVER DOZE

## 2023-05-03 NOTE — PROGRESS NOTES
"Chief Complaint   Patient presents with     Sleep Problem     Will have a double jaw surgery and has snoring symptoms. Need sleep study prior to surgery.        Initial There were no vitals taken for this visit. Estimated body mass index is 26.91 kg/m  as calculated from the following:    Height as of 10/23/21: 1.88 m (6' 2\").    Weight as of 2/27/23: 95.1 kg (209 lb 9.6 oz).    Medication Reconciliation: complete    Neck circumference: 15/7 inches / 40 centimeters.    Karo Ontiveros CMA on 5/3/2023 at 9:42 AM   "

## 2023-05-03 NOTE — PROGRESS NOTES
Outpatient Sleep Medicine Consultation:      Name: John Monteiro MRN# [id number]   Age: 27 year old YOB: 1995     Date of Consultation: May 3, 2023  Consultation is requested by: No referring provider defined for this encounter. No ref. provider found  Primary care provider: Etta Robbins       Reason for Sleep Consult:     John Monteiro is sent by Edilma Srinivasan for a sleep consultation regarding snoring.    Patient s Reason for visit  John Monteiro main reason for visit: Upcoming double jaw surgery. The surgeon asked for me to get sleep study done since I've shown sleep apnea symptoms  Patient states problem(s) started: It started when I got braces  John Monteiro's goals for this visit: To get a sleep study scheduled           Assessment and Plan:     Loud, socially disruptive snoring, witnessed apneas, morning headaches, moderate retrognatia, crowded oropharynx   Planned Oral and Maxillofacial Surgery of some sort   - Polysomnogram (using 4% desaturation/Medicare/ AASM 1B scoring rules) for possible obstructive sleep apnea. Patient is a poor candidate for Home Sleep Testing due to symptoms of RUBY but not high pre-test probability of RUBY  (STOPBANG 3).  -  If HST normal would recommend attended Polysomnogram. If mild obstructive sleep apnea would recommend CPAP. If moderate-severe would recommend CPAP       Sarah to follow up with Primary Care provider regarding elevated blood pressure.     Summary Counseling:    Sleep Testing Reviewed  Obstructive Sleep Apnea Reviewed  Complications of Untreated Sleep Apnea Reviewed  Sleep Hygiene  Treatment options for obstructive sleep apnea reviewed       I spent 35 minutes with patient including counseling, and 10 minutes with chart review, and documentation     CC Edilma Srinivasan; Michelet Brunson and Betty Reyes at   dental          History of Present Illness:       SLEEP-WAKE SCHEDULE:     Work/School Days: Patient goes to  school/work: Yes   Usually gets into bed at 10pm  Takes patient about 10 min to fall asleep  Has trouble falling asleep not often    Wakes up in the middle of the night 2 to 3 times times.  Wakes up due to Uncertain  He has trouble falling back asleep none times a week.   It usually takes 5 min to get back to sleep  Patient is usually up at 6:30AM  Uses alarm: Yes    Weekends/Non-work Days/All Other Days:  Usually gets into bed at 1 AM   Takes patient about 5 Min to fall asleep  Patient is usually up at 8 AM  Uses alarm: No    Sleep Need  Patient gets  7 to 8 hours sleep on average   Patient thinks he needs about 8 to 9 hours sleep    John Monteiro prefers to sleep in this position(s): Side   Patient states they do the following activities in bed: Use phone, computer, or tablet     Naps  Patient takes a purposeful nap 0 times a week and naps are usually N/A in duration  He feels better after a nap: No  He dozes off unintentionally 0 days per week  Patient has had a driving accident or near-miss due to sleepiness/drowsiness: No      SLEEP DISRUPTIONS:    Breathing/Snoring  Patient snores:Yes, loud  Other people complain about his snoring: Yes  Patient has been told he stops breathing in his sleep:Yes  He has issues with the following: Morning headaches;Morning mouth dryness;Stuffy nose when you wake up    Movement:  Patient gets pain, discomfort, with an urge to move:  No  It happens when he is resting:  No  It happens more at night:  No  Patient has been told he kicks his legs at night:  No     Behaviors in Sleep:  John Monteiro has experienced the following behaviors while sleeping: Sleep-talking  He has experienced sudden muscle weakness during the day: No      CAFFEINE AND OTHER SUBSTANCES:    Patient consumes caffeinated beverages per day:  1 cup of coffee, large, Tea throughout the day  List of any prescribed or over the counter stimulants that patient takes:    List of any prescribed or over the  counter sleep medication patient takes:    List of previous sleep medications that patient has tried: melatonin  Patient drinks alcohol to help them sleep: No  Patient drinks alcohol near bedtime: No    Family History:  Patient has a family member been diagnosed with a sleep disorder: No            SCALES:    EPWORTH SLEEPINESS SCALE         5/3/2023     9:31 AM    [place] Sleepiness Scale ( ADENIKE Taylor  [id number]<br>ESS - [place]/English - Final version - 21 Nov 07 - [facility] [place].)   Sitting and reading Slight chance of dozing   Watching TV Would never doze   Sitting, inactive in a public place (e.g. a theatre or a meeting) Would never doze   As a passenger in a car for an hour without a break Moderate chance of dozing   Lying down to rest in the afternoon when circumstances permit Slight chance of dozing   Sitting and talking to someone Would never doze   Sitting quietly after a lunch without alcohol Would never doze   In a car, while stopped for a few minutes in traffic Would never doze   [place] Score (MC) 4   [place] Score (Sleep) 4         INSOMNIA SEVERITY INDEX (REBECA)          5/3/2023     9:18 AM   Insomnia Severity Index (REBECA)   Difficulty falling asleep 0   Difficulty staying asleep 0   Problems waking up too early 1   How SATISFIED/DISSATISFIED are you with your CURRENT sleep pattern? 1   How NOTICEABLE to others do you think your sleep problem is in terms of impairing the quality of your life? 1   How WORRIED/DISTRESSED are you about your current sleep problem? 1   To what extent do you consider your sleep problem to INTERFERE with your daily functioning (e.g. daytime fatigue, mood, ability to function at work/daily chores, concentration, memory, mood, etc.) CURRENTLY? 1   REBECA Total Score 5       Guidelines for Scoring/Interpretation:  Total score categories:  0-7 = No clinically significant insomnia   8-14 = Subthreshold insomnia   15-21 = Clinical insomnia (moderate severity)  22-28 = Clinical  insomnia (severe)  Used via courtesy of www.myhealth.va.gov with permission from Enoch Mcgowan PhD., [facility]          Allergies:    Allergies   Allergen Reactions     Seasonal Allergies      Ragweed and trees       Medications:    Current Outpatient Medications   Medication Sig Dispense Refill     amphetamine-dextroamphetamine (ADDERALL XR) 10 MG 24 hr capsule Take 10 mg by mouth       cetirizine (ZYRTEC) 10 MG tablet Take 1 tablet (10 mg) by mouth daily 30 tablet 3     cholecalciferol (VITAMIN D3) 25 mcg (1000 units) capsule Take 1,000 Units by mouth       finasteride (PROPECIA) 1 MG tablet        ketoconazole (NIZORAL) 2 % external shampoo SHAMPOO 3X PER WEEK         Problem List:  Patient Active Problem List    Diagnosis Date Noted     ADHD (attention deficit hyperactivity disorder), combined type 05/09/2022     Priority: Medium     Allergies 05/02/2023     Priority: Low     Spring to fall, primary eyes/sinus/nose but now some chest sx          Past Medical/Surgical History:  Past Medical History:   Diagnosis Date     Abdominal pain 12/18/2012     ADHD      Ankle sprain 2019    Per patient report- R ankle roll when playing basketball in Spring 2019     Appendicitis 09/19/2009     Distal radius fracture 10/23/2021     Elbow fracture 07/25/2018     Pneumonia 04/16/2009     Past Surgical History:   Procedure Laterality Date     APPENDECTOMY  Sep 19, 2009    laparoscopic       Social History:  Social History     Socioeconomic History     Marital status: Single     Spouse name: Not on file     Number of children: Not on file     Years of education: Not on file     Highest education level: Not on file   Occupational History     Occupation: Student     Occupation:    Tobacco Use     Smoking status: Never     Smokeless tobacco: Never   Vaping Use     Vaping status: Not on file   Substance and Sexual Activity     Alcohol use: No     Drug use: No     Sexual activity: Yes     Partners: Female      Birth control/protection: Condom   Other Topics Concern     Not on file   Social History Narrative     Not on file     Social Determinants of Health     Financial Resource Strain: Not on file   Food Insecurity: Not on file   Transportation Needs: Not on file   Physical Activity: Unknown (9/24/2020)    Exercise Vital Sign      Days of Exercise per Week: 1 day      Minutes of Exercise per Session: Not on file   Stress: Not on file   Social Connections: Not on file   Intimate Partner Violence: Not on file   Housing Stability: Not on file       Family History:  Family History   Problem Relation Age of Onset     Lipids Mother      Family History Negative Father        Review of Systems:  A complete review of systems reviewed by me is negative with the exeption of what has been mentioned in the history of present illness.  In the last TWO WEEKS have you experienced any of the following symptoms?  Fevers: No  Night Sweats: No  Weight Gain: No  Pain at Night: No  Double Vision: No  Changes in Vision: No  Difficulty Breathing through Nose: Yes  Sore Throat in Morning: Yes  Dry Mouth in the Morning: Yes  Shortness of Breath Lying Flat: Yes  Shortness of Breath With Activity: No  Awakening with Shortness of Breath: No  Increased Cough: Yes  Heart Racing at Night: No  Swelling in Feet or Legs: No  Diarrhea at Night: No  Heartburn at Night: No  Urinating More than Once at Night: No  Losing Control of Urine at Night: No  Joint Pains at Night: No  Headaches in Morning: Yes  Weakness in Arms or Legs: No  Depressed Mood: No  Anxiety: No     Physical Examination:  Vitals: There were no vitals taken for this visit.  BMI= There is no height or weight on file to calculate BMI.           GENERAL APPEARANCE: healthy, alert and no distress  EYES: Eyes grossly normal to inspection and conjunctivae and sclerae normal  HENT: nose and mouth without ulcers or lesions and oropharynx crowded  RESP: no tachypnea, cough  CV: regular rates and  rhythm  ABDOMEN: schaphold  MS: extremities normal- no gross deformities noted  SKIN: no suspicious lesions or rashes  NEURO: Normal strength and tone, mentation intact, speech normal and cranial nerves 2-12 intact  PSYCH: mentation appears normal and affect normal/bright  Mallampati Class: III.  Tonsillar Stage: 1  hidden by pillars.         Data: All pertinent previous laboratory data reviewed         Amphetamine Qual Urine (no units)   Date Value   10/27/2012     Negative   Cutoff for a negative amphetamine is 500 ng/mL or less.     Barbiturates Qual Urine (no units)   Date Value   10/27/2012     Negative   Cutoff for a negative barbiturate is 200 ng/mL or less.     Benzodiazepine Qual Urine (no units)   Date Value   10/27/2012     Negative   Cutoff for a negative benzodiazepine is 200 ng/mL or less.     Cannabinoids Qual Urine (no units)   Date Value   10/27/2012 (A)    Positive   Cutoff for a positive cannabinoid is greater than 50 ng/mL. This is an   unconfirmed screening result to be used for medical purposes only.     Cocaine Qual Urine (no units)   Date Value   10/27/2012     Negative   Cutoff for a negative cocaine is 300 ng/mL or less.     Opiates Qualitative Urine (no units)   Date Value   10/27/2012     Negative   Cutoff for a negative opiate is 300 ng/mL or less.     PCP Qual Urine (no units)   Date Value   10/27/2012     Negative   Cutoff for a negative PCP is 25 ng/mL or less.           Paresh Painting MD 5/3/2023

## 2023-05-03 NOTE — NURSING NOTE
Writer scheduled patient for sleep study & follow up with provider. Writer sent out sleep study information via [employer].

## 2023-05-12 DIAGNOSIS — M26.4: Primary | ICD-10-CM

## 2023-08-03 ENCOUNTER — THERAPY VISIT (OUTPATIENT)
Dept: SLEEP MEDICINE | Facility: CLINIC | Age: 28
End: 2023-08-03
Attending: INTERNAL MEDICINE
Payer: COMMERCIAL

## 2023-08-03 DIAGNOSIS — G47.9 DISTURBANCE IN SLEEP BEHAVIOR: ICD-10-CM

## 2023-08-03 DIAGNOSIS — R06.83 SNORING: ICD-10-CM

## 2023-08-03 PROCEDURE — 95810 POLYSOM 6/> YRS 4/> PARAM: CPT | Performed by: INTERNAL MEDICINE

## 2023-08-22 NOTE — PROCEDURES
SLEEP STUDY INTERPRETATION  DIAGNOSTIC POLYSOMNOGRAPHY REPORT      Patient: OLAYINKA CONWAY  YOB: 1995  Study Date: 8/3/2023  MRN: [id number]  Referring Provider: self  Ordering Provider: Garima Yoder    Indications for Polysomnography: The patient is a 27 year old Male who is 6' and weighs 208.0 lbs. His BMI is 28.5, [place] sleepiness scale 04 and neck circumference is 42 cm. A diagnostic polysomnogram was performed to evaluate loud, socially disruptive snoring, witnessed apneas, morning headaches, moderate retrognathia, crowded oropharynx. Planned Oral and Maxillofacial Surgery of some sort    Polysomnogram Data: A full night polysomnogram recorded the standard physiologic parameters including EEG, EOG, EMG, ECG, nasal and oral airflow. Respiratory parameters of chest and abdominal movements were recorded with respiratory inductance plethysmography. Oxygen saturation was recorded by pulse oximetry. Hypopnea scoring rule used: 1B 4%.    Sleep Architecture:   The total recording time of the polysomnogram was 465.5 minutes. The total sleep time was 419.0 minutes. Sleep latency was decreased at 7.5 minutes with the use of a sleep aid (Unisom). REM latency was 160.5 minutes. Arousal index was increased at 30.8 arousals per hour. Sleep efficiency was normal at 90.0%. Wake after sleep onset was 39.0 minutes. The patient spent 9.2% of total sleep time in Stage N1, 53.7% in Stage N2, 19.8% in Stage N3, and 17.3% in REM. Time in REM supine was 34.0 minutes.    Respiration:   Events ? The polysomnogram revealed a presence of 2 obstructive, 1 central, and 0 mixed apneas resulting in an apnea index of 0.4 events per hour. There were 7 obstructive hypopneas and 0 central hypopneas resulting in an obstructive hypopnea index of 1.0 and central hypopnea index of 0 events per hour. The combined apnea/hypopnea index was 1.4 events per hour (central apnea/hypopnea index was 0.1 events per hour). The REM AHI  was 0.8 events per hour. The supine AHI was 1.7 events per hour. The RERA index was 6.6 events per hour.  The RDI was 8.0 events per hour.  Snoring - was reported as mild  Respiratory rate and pattern - was notable for normal respiratory rate and pattern.  Sustained Sleep Associated Hypoventilation - Transcutaneous carbon dioxide monitoring was not used, however significant hypoventilation was not suggested by oximetry   Sleep Associated Hypoxemia - (Greater than 5 minutes O2 sat at or below 88%) was not present. Baseline oxygen saturation was 95.7%. Lowest oxygen saturation was 90.0%. Time spent less than or equal to 88% was 0 minutes. Time spent less than or equal to 89% was 0 minutes.    Movement Activity:    Periodic Limb Activity - There were 0 PLMs during the entire study   REM EMG Activity - Excessive transient/sustained muscle activity was not present.  Nocturnal Behavior - Abnormal sleep related behaviors were not noted   Bruxism - None apparent.    Cardiac Summary:   The average pulse rate was 57.9 bpm. The minimum pulse rate was 19.0 bpm while the maximum pulse rate was 214.0 bpm.  Arrhythmias were not noted.      Assessment:   No evidence of obstructive sleep apnea    Recommendations:  Advice regarding the risks of drowsy driving.  Suggest optimizing sleep schedule and avoiding sleep deprivation.        Diagnostic Codes:   Snoring   _____________________________________   Electronically Signed By: Paresh Painting MD 8/22/23           Range(%) Time in range (min)   0.0 - 89.0 -   0.0 - 88.0 -         Stage Min(mm Hg) Max(mm Hg)   Wake - -   NREM(1+2+3) - -   REM - -       Range(mmHg) Time in range (min)   - -   - -

## 2023-08-24 ENCOUNTER — TELEPHONE (OUTPATIENT)
Dept: SLEEP MEDICINE | Facility: CLINIC | Age: 28
End: 2023-08-24
Payer: COMMERCIAL

## 2023-08-24 LAB — SLPCOMP: NORMAL

## 2023-08-24 NOTE — TELEPHONE ENCOUNTER
Pt was called to inform them that appt on 8/31 with Dr Painting is being switched to vdeo. LVM for them to call back if that doesn't work for them

## 2023-08-28 ASSESSMENT — SLEEP AND FATIGUE QUESTIONNAIRES
HOW LIKELY ARE YOU TO NOD OFF OR FALL ASLEEP WHILE SITTING INACTIVE IN A PUBLIC PLACE: SLIGHT CHANCE OF DOZING
HOW LIKELY ARE YOU TO NOD OFF OR FALL ASLEEP WHILE WATCHING TV: WOULD NEVER DOZE
HOW LIKELY ARE YOU TO NOD OFF OR FALL ASLEEP IN A CAR, WHILE STOPPED FOR A FEW MINUTES IN TRAFFIC: WOULD NEVER DOZE
HOW LIKELY ARE YOU TO NOD OFF OR FALL ASLEEP WHILE SITTING AND TALKING TO SOMEONE: WOULD NEVER DOZE
HOW LIKELY ARE YOU TO NOD OFF OR FALL ASLEEP WHILE SITTING QUIETLY AFTER LUNCH WITHOUT ALCOHOL: WOULD NEVER DOZE
HOW LIKELY ARE YOU TO NOD OFF OR FALL ASLEEP WHILE LYING DOWN TO REST IN THE AFTERNOON WHEN CIRCUMSTANCES PERMIT: SLIGHT CHANCE OF DOZING
HOW LIKELY ARE YOU TO NOD OFF OR FALL ASLEEP WHEN YOU ARE A PASSENGER IN A CAR FOR AN HOUR WITHOUT A BREAK: WOULD NEVER DOZE
HOW LIKELY ARE YOU TO NOD OFF OR FALL ASLEEP WHILE SITTING AND READING: SLIGHT CHANCE OF DOZING

## 2023-08-31 ENCOUNTER — VIRTUAL VISIT (OUTPATIENT)
Dept: SLEEP MEDICINE | Facility: CLINIC | Age: 28
End: 2023-08-31
Payer: COMMERCIAL

## 2023-08-31 DIAGNOSIS — R06.83 SNORING: Primary | ICD-10-CM

## 2023-08-31 PROCEDURE — 99213 OFFICE O/P EST LOW 20 MIN: CPT | Mod: 95 | Performed by: INTERNAL MEDICINE

## 2023-08-31 ASSESSMENT — PAIN SCALES - GENERAL: PAINLEVEL: NO PAIN (0)

## 2023-08-31 NOTE — PROGRESS NOTES
Sleep Study Follow-Up Visit:    Date on this visit: 8/31/2023    John Monteiro for follow-up of his sleep study done at the [facility] for .  Loud, socially disruptive snoring, witnessed apneas, morning headaches, moderate retrognatia, crowded oropharynx. Planned Oral and Maxillofacial surgery of some sort upper and lower jawm (for underbite)    Study Date: 8/3/2023 (208 lbs)   Sleep Architecture:   The total recording time of the polysomnogram was 465.5 minutes. The total sleep time was 419.0 minutes. Sleep latency was decreased at 7.5 minutes with the use of a sleep aid (Unisom). REM latency was 160.5 minutes. Arousal index was increased at 30.8 arousals per hour. Sleep efficiency was normal at 90.0%. Wake after sleep onset was 39.0 minutes. The patient spent 9.2% of total sleep time in Stage N1, 53.7% in Stage N2, 19.8% in Stage N3, and 17.3% in REM. Time in REM supine was 34.0 minutes.  Respiration:   Events ? The polysomnogram revealed a presence of 2 obstructive, 1 central, and 0 mixed apneas resulting in an apnea index of 0.4 events per hour. There were 7 obstructive hypopneas and 0 central hypopneas resulting in an obstructive hypopnea index of 1.0 and central hypopnea index of 0 events per hour. The combined apnea/hypopnea index was 1.4 events per hour (central apnea/hypopnea index was 0.1 events per hour). The REM AHI was 0.8 events per hour. The supine AHI was 1.7 events per hour. The RERA index was 6.6 events per hour.  The RDI was 8.0 events per hour.  Snoring - was reported as mild  Respiratory rate and pattern - was notable for normal respiratory rate and pattern.  Sustained Sleep Associated Hypoventilation - Transcutaneous carbon dioxide monitoring was not used, however significant hypoventilation was not suggested by oximetry   Sleep Associated Hypoxemia - (Greater than 5 minutes O2 sat at or below 88%) was not present. Baseline oxygen saturation was 95.7%. Lowest oxygen  saturation was 90.0%. Time spent less than or equal to 88% was 0 minutes. Time spent less than or equal to 89% was 0 minutes.  Movement Activity:    Periodic Limb Activity - There were 0 PLMs during the entire study   REM EMG Activity - Excessive transient/sustained muscle activity was not present.  Nocturnal Behavior - Abnormal sleep related behaviors were not noted   Bruxism - None apparent.  Cardiac Summary:   The average pulse rate was 57.9 bpm. The minimum pulse rate was 19.0 bpm while the maximum pulse rate was 214.0 bpm.  Arrhythmias were not noted     These findings were reviewed with patient.         Past medical/surgical history, family history, social history, medications and allergies were reviewed.      Problem List:  Patient Active Problem List    Diagnosis Date Noted    ADHD (attention deficit hyperactivity disorder), combined type 05/09/2022     Priority: Medium    Allergies 05/02/2023     Priority: Low     Spring to fall, primary eyes/sinus/nose but now some chest sx          Impression/Plan:    No evidence of obstructive sleep apnea  Signs and symptoms that would prompt re-evaluation were discussed.       He will follow up with me in about if needed .       I spent 5 minutes with patient including counseling, and 5 minutes with chart review, and documentation

## 2023-08-31 NOTE — PROGRESS NOTES
Virtual Visit Details    Type of service:  Video Visit   Video Start Time: 3:19 PM  Video End Time:3:31 PM    Originating Location (pt. Location): Home    Distant Location (provider location):  Off-site  Platform used for Video Visit: Alberto

## 2023-08-31 NOTE — NURSING NOTE
Patient denies any changes since echeck-in regarding medication and allergies and states all information entered during echeck-in remains accurate.    Is the patient currently in the state of [place]? YES    Visit mode:VIDEO    If the visit is dropped, the patient can be reconnected by: VIDEO VISIT: Send to e-mail at: [email or web address]    Will anyone else be joining the visit? NO  (If patient encounters technical issues they should call [phone]    How would you like to obtain your AVS? MyChart    Are changes needed to the allergy or medication list? No, Pt stated no changes to allergies, and Pt stated no med changes    Reason for visit: Follow Up (Follow up from sleep study, no updates per pt. Medications/allergies reviewed by pt via [employer], no changes per pt. No pt reported vitals today per pt.)    Has patient had flu shot for current/most recent flu season? If so, when? No    Donna Dawson, Visit Facilitator/MA.

## 2023-11-14 NOTE — H&P
"ORAL AND MAXILLOFACIAL HISTORY AND PHYSICAL     CHIEF COMPLAINT:  \"My orthodontist says I am ready for jaw surgery\"    HISTORY OF PRESENT ILLNESS:  John Monteiro is a 28 year old male who presents as a referral from Dr. Lema from [facility] for preorthognathic evaluation. Reports that he has been on orthodontic treatment since 2021 to decompensate and align his dentition for orthognathic surgery. At the initial consult with us, he was asked to fill out a questionnaire in which he answered: he would like to make the upper front teeth fit the lower jaw and he would like to move his upper teeth down so he can bring his front teeth together. He reports that it's hard to bite food with his front teeth not aligned well together, that he only uses his back molars to function for a few years. Reports recent history of TMJ pain when opening wide or yawning. The obstructive sleep apnea assessment was done at the initial consult. He admits to snoring and has been told that he stopped breathing at night. He reports excessive daytim sleepiness but denies frequent morning headaches or nightmares. He has never been diagnosed with sleep apnea, has never had a sleep study and has never been treated for it. Patient today denies any fever or chills. He denies other constitutional symptoms.     PMH:   Premature delivery, no treatment indications  Chronic bronchitis now resolved no medication no follow-up indications  Asthma (last attack was when he was a kid)     PSH:   Appendectomy (2010, 2011) with no complications with GA     MEDICATIONS:  Finasteride (hair loss)    ALLERGIES:  NKDA    SOCIAL HISTORY:  (-) tobacco, (-) etoh, (-) illicit or recreational drug use    ROS:   comprehensive review of systems completed and negative aside from listed in HPI    PHYSICAL EXAM:  Vitals:   BP: 145/96   HR: 94   SpO2: 99%  Height: 6\"2'   Weight: 204.4 lbs 92.7 kg     GEN: WD/WN and NAD  HEENT: NC/AT, EOMI, PERRL, no facial edema, no " asymmetry, no induration or erythema, no abnormal skin lesions, inferior border of mandible continuous, neck soft and supple with no palpable lymph nodes, and CAROLINA >45mm  I/O: OP clear, uvula midline, fair OH, grossly intact dentition, no vestibular edema, FOM NT/ND, no erythema, no drainage, no purulence, no ulcerations, no lesions , and teeth #1,16,17,32 are missing, orthodontic brackets and wires on both upper and lower dentition in place and stable  Neuro: AAOx4, CN V grossly intact bilaterally, and CN VII grossly intact bilaterally  Cardio: warm, well profused, no pitting edema, RRR, and No murmurs auscultated  Pulm: Breathing comfortably on room air, no respiratory distress and CTAB    PERIODONTAL PROBING DEPTH:   Probing depths were not recorded during this encounter    AIRWAY EVALUATION:   Mallampati I, Submental length > 3 fingers, no limitation with neck extension or flexion, and CAROLINA > 45 mm    RADIOGRAPHIC EVALUATION:  OPG - No clinically relevant OPG was available to review       CBCT: Independently reviewed. 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 Patient is at low cardiovascular risks for general anesthesia.     PLAN:  - [place] I, BSSO  surgery with placement of splints as indicated in the operating room under general anesthesia.   - Recommend hold finasteride on day of surgery   - Per Dr. Betty Reyes, a surgical planning will be sent to patient's email at [email or web address] when it is available. Of note, today patient expressed that he would like to move his lower teeth backward though wants his chin to move as little as possible. He likes the way how his chin looks at this time.   - All questions were invited and answered    *Clinical photos from 4/2023 are available on axium, CBCT and intraoral scans from today uploaded to [employer]*    RISKS/BENEFITS:  Lengthy discussion with the patient regarding the risk, benefits, and alternatives of maxillary and mandibular surgery including, but not limited to bleeding, bruising, infection, swelling, pain, discomfort, osteomyelitis, osteonecrosis, malunion, nonunion, relapse, injury to nerves, vessels, teeth, and soft tissue, permanent facial numbness and weakness including permanent lip and chin numbness, permanent tongue numbness, permanent loss of face, blindness, need for another procedure, progression of temporomandibular joint symptoms/pain, unsightly scarring, blood loss, and need for transfusion as well as general anesthesia risk including heart attacks, stroke, and death.    Chely Marcos DDS   Oral and Maxillofacial Surgery, Intern    Findings, assessment, and plan discussed with Betty Reyes DDS

## 2023-11-27 NOTE — PRE-PROCEDURE
"ORAL & MAXILLOFACIAL SURGERY   PREOPERATIVE  NOTE:  John Monteiro,  MRN: [id number],  : 1995      Date of Procedure:   2023    Pre-Operative Diagnosis:  1.transverse maxillary deficiency, mandibular hyperplasia, maxillary anterioposterior deficiency    Planned Procedure:  Leforte 1 osteotomy and bilateral sagittal split osteotomy    Planned Anesthesia: General via Nasal endotracheal tube    Attending Surgeon:  Betty Reyes DDS    Resident Surgeon:  José Miguel Burger DDS    Resident Assistants:  Jojo Coto DDS    Consent:  Risks, benefits, and alternatives discussed thoroughly. Discussion included  risks/complications including but not limited to postoperative pain, swelling, bleeding, infection, wound dehiscence, damage to adjacent structures, nonunion, malunion, paraesthesias, hardware failure, and need for additional procedures (occlusal equilibration/endodontic therapy). Patient  voiced understanding of the risks, benefits and alternative treatment and verbally consented.      Tomás Shook DDS  Oral & Maxillofacial Surgery, PGY-2    ______________________________________________________________________________________________       ORAL AND MAXILLOFACIAL HISTORY AND PHYSICAL   CHIEF COMPLAINT:  \"My orthodontist says I am ready for jaw surgery\"  HISTORY OF PRESENT ILLNESS:  John Monteiro is a 28 year old male who presents as a referral from Dr. Lema from [facility] for preorthognathic evaluation. Reports that he has been on orthodontic treatment since  to decompensate and align his dentition for orthognathic surgery. At the initial consult with us, he was asked to fill out a questionnaire in which he answered: he would like to make the upper front teeth fit the lower jaw and he would like to move his upper teeth down so he can bring his front teeth together. He reports that it's hard to bite food with his front teeth not aligned well together, that he only uses his " back molars to function for a few years. Reports recent history of TMJ pain when opening wide or yawning. The obstructive sleep apnea assessment was done at the initial consult. He admits to snoring and has been told that he stopped breathing at night. He reports excessive daytim sleepiness but denies frequent morning headaches or nightmares. He has never been diagnosed with sleep apnea, has never had a sleep study and has never been treated for it. Patient today denies any fever or chills. He denies other constitutional symptoms.   PMH:   Premature delivery, no treatment indications  Chronic bronchitis no medication indication  Asthma (last attack was when he was a kid)   PSH:   Appendectomy (2010, 2011) with no complications with GA   MEDICATIONS:  Finasteride (hair loss)  ALLERGIES:  NKDA  SOCIAL HISTORY:  (-) tobacco, (-) etoh, (-) illicit or recreational drug use  ROS:   comprehensive review of systems completed and negative aside from listed in HPI  PHYSICAL EXAM:  GEN: WD/WN and NAD  HEENT: NC/AT, EOMI, PERRL, no facial edema, no asymmetry, no induration or erythema, no abnormal skin lesions, inferior border of mandible continuous, neck soft and supple with no palpable lymph nodes, and CAROLINA >45mm  I/O: OP clear, uvula midline, fair OH, grossly intact dentition, no vestibular edema, FOM NT/ND, no erythema, no drainage, no purulence, no ulcerations, no lesions , and teeth #1,16,17,32 are missing, orthodontic brackets and wires on both upper and lower dentition in place and stable  Neuro: AAOx4, CN V grossly intact bilaterally, and CN VII grossly intact bilaterally  Cardio: warm, well profused, no pitting edema, RRR, and No murmurs auscultated  Pulm: Breathing comfortably on room air, no respiratory distress and CTAB  PERIODONTAL PROBING DEPTH:   Probing depths were not recorded during this encounter  AIRWAY EVALUATION:   Mallampati I, Submental length > 3 fingers, no limitation with neck extension or flexion,  and CAROLINA > 45 mm  RADIOGRAPHIC EVALUATION:  OPG - No clinically relevant OPG was available to review  CBCT: Independently reviewed. 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 Patient is at low cardiovascular risks for general anesthesia.   PLAN:  - [place] I, BSSO surgery with placement of an splints as indicated in the operating room under general anesthesia.   - Recommend hold finasteride on day of surgery   - Per Dr. Betty Reyes, a surgical planning will be sent to patient's email at [email or web address] when it is available. Of note, today patient expressed that he would like to move his lower teeth backward though wants his chin to move as little as possible. He likes the way how his chin looks at this time.   - All questions were invited and answered  *Clinical photos from 4/2023 are available on axium, CBCT and intraoral scans from today uploaded to [employer]*  RISKS/BENEFITS:  Lengthy discussion with the patient regarding the risk, benefits, and alternatives of maxillary and mandibular surgery including, but not limited to bleeding, bruising, infection, swelling, pain, discomfort, osteomyelitis, osteonecrosis, malunion, nonunion, relapse, injury to  nerves, vessels, teeth, and soft tissue, permanent facial numbness and weakness including permanent lip and chin numbness, permanent tongue numbness, permanent loss of face, blindness, need for another procedure, progression of temporomandibular joint symptoms/pain, unsightly scarring, blood loss, and need for transfusion as well as general anesthesia risk including heart attacks, stroke, and death.  Chely Marcos DDS   Oral and Maxillofacial Surgery, Intern  Findings, assessment, and plan discussed with Betty Reyes DDS   See student/resident's note above for details. As the approving(supervising) , I attest that I've seen and evaluated the patient, was present for the critical or key portions of the treatment and agree with the student/resident's treatments, findings and plans as written.   Electronically signed by: Betty Reyes DDS 11/22/2023 @ 06:38:07 AM

## 2023-11-28 ENCOUNTER — ANESTHESIA EVENT (OUTPATIENT)
Dept: SURGERY | Facility: CLINIC | Age: 28
End: 2023-11-28
Payer: COMMERCIAL

## 2023-11-28 NOTE — ANESTHESIA PREPROCEDURE EVALUATION
Anesthesia Pre-Procedure Evaluation    Patient: John Monteiro   MRN: [id number] : 1995        Procedure : Procedure(s):  OSTEOTOMY, LE FORT I, WITH MANDIBULAR SAGITTAL SPLIT          Past Medical History:   Diagnosis Date    Abdominal pain 2012    ADHD     Ankle sprain     Per patient report- R ankle roll when playing basketball in Spring 2019    Appendicitis 2009    Distal radius fracture 10/23/2021    Elbow fracture 2018    Pneumonia 2009      Past Surgical History:   Procedure Laterality Date    APPENDECTOMY  Sep 19, 2009    laparoscopic      Allergies   Allergen Reactions    Seasonal Allergies      Ragweed and trees      Social History     Tobacco Use    Smoking status: Never    Smokeless tobacco: Never   Substance Use Topics    Alcohol use: No      Wt Readings from Last 1 Encounters:   23 94.4 kg (208 lb 3.2 oz)        Anesthesia Evaluation   Pt has had prior anesthetic. Type: General.        ROS/MED HX  ENT/Pulmonary:     (+) sleep apnea, doesn't use CPAP,                   asthma  Treatment: Inhaler prn,                 Neurologic:  - neg neurologic ROS     Cardiovascular:  - neg cardiovascular ROS     METS/Exercise Tolerance:     Hematologic:  - neg hematologic  ROS     Musculoskeletal:  - neg musculoskeletal ROS     GI/Hepatic:     (+)         appendicitis (s/p surgery in ),           Renal/Genitourinary:  - neg Renal ROS     Endo:  - neg endo ROS     Psychiatric/Substance Use: Comment: ADHD on stimulant    (+)     Recreational drug usage: Other (Comment).    Infectious Disease:  - neg infectious disease ROS     Malignancy:  - neg malignancy ROS     Other:            Physical Exam    Airway        Mallampati: II   TM distance: > 3 FB   Neck ROM: full   Mouth opening: > 3 cm    Respiratory Devices and Support         Dental       (+) Minor Abnormalities - some fillings, tiny chips      Cardiovascular   cardiovascular exam normal          Pulmonary   pulmonary  "exam normal                OUTSIDE LABS:  CBC:   Lab Results   Component Value Date    WBC 5.9 12/27/2012    WBC 8.0 05/10/2010    HGB 14.2 12/27/2012    HGB 12.7 05/10/2010    HCT 41.9 12/27/2012    HCT 38.0 05/10/2010     12/27/2012     05/10/2010     BMP:   Lab Results   Component Value Date     09/19/2009     04/16/2009    POTASSIUM 3.9 09/19/2009    POTASSIUM 3.7 04/16/2009    CHLORIDE 106 09/19/2009    CHLORIDE 102 04/16/2009    CO2 27 09/19/2009    CO2 24 04/16/2009    BUN 15 09/19/2009    BUN 14 04/16/2009    CR 0.49 09/19/2009    CR 0.64 04/16/2009    GLC 90 12/27/2012    GLC 85 09/19/2009     COAGS: No results found for: \"PTT\", \"INR\", \"FIBR\"  POC: No results found for: \"BGM\", \"HCG\", \"HCGS\"  HEPATIC:   Lab Results   Component Value Date    ALBUMIN 4.5 04/16/2009    PROTTOTAL 7.9 04/16/2009    ALT 13 05/10/2010    AST 41 05/10/2010    ALKPHOS 378 04/16/2009    BILITOTAL 0.2 04/16/2009     OTHER:   Lab Results   Component Value Date    RANDELL 9.8 09/19/2009    LIPASE 37 05/10/2010    AMYLASE 65 05/10/2010       Anesthesia Plan    ASA Status:  2    NPO Status:  NPO Appropriate    Anesthesia Type: General.     - Airway: ETT   Induction: Intravenous.   Maintenance: Balanced.   Techniques and Equipment:     - Airway: Nasal LISA     - Lines/Monitors: BIS     Consents    Anesthesia Plan(s) and associated risks, benefits, and realistic alternatives discussed. Questions answered and patient/representative(s) expressed understanding.     - Discussed: Risks, Benefits and Alternatives for BOTH SEDATION and the PROCEDURE were discussed     - Discussed with:  Patient      - Extended Intubation/Ventilatory Support Discussed: Yes.      - Patient is DNR/DNI Status: No     Use of blood products discussed: Yes.     - Discussed with: Patient.     - Consented: consented to blood products     Postoperative Care    Pain management: IV analgesics, Oral pain medications, Multi-modal analgesia.   PONV " prophylaxis: Ondansetron (or other 5HT-3), Dexamethasone or Solumedrol     Comments:              PAC Discussion and Assessment    ASA Classification: 2    Anesthetic techniques and relevant risks discussed: GA  Invasive monitoring and risk discussed: No    Possibility and Risk of blood transfusion discussed: Yes  NPO instructions given: NPO after midnight    Needs early admission to pre-op area: No                                            Nery Hendrix MD    I have reviewed the pertinent notes and labs in the chart from the past 30 days and (re)examined the patient.  Any updates or changes from those notes are reflected in this note.

## 2023-11-29 ENCOUNTER — ANESTHESIA (OUTPATIENT)
Dept: SURGERY | Facility: CLINIC | Age: 28
End: 2023-11-29
Payer: COMMERCIAL

## 2023-11-29 ENCOUNTER — HOSPITAL ENCOUNTER (INPATIENT)
Facility: CLINIC | Age: 28
LOS: 2 days | Discharge: HOME | End: 2023-12-01
Attending: DENTIST | Admitting: DENTIST
Payer: COMMERCIAL

## 2023-11-29 DIAGNOSIS — M26.02 MAXILLARY HYPOPLASIA: Primary | ICD-10-CM

## 2023-11-29 LAB
ABO/RH(D): NORMAL
ANTIBODY SCREEN: NEGATIVE
GLUCOSE BLDC GLUCOMTR-MCNC: 90 MG/DL (ref 70–99)
SPECIMEN EXPIRATION DATE: NORMAL

## 2023-11-29 PROCEDURE — 86901 BLOOD TYPING SEROLOGIC RH(D): CPT | Performed by: ANESTHESIOLOGY

## 2023-11-29 PROCEDURE — 999N000141 HC STATISTIC PRE-PROCEDURE NURSING ASSESSMENT: Performed by: DENTIST

## 2023-11-29 PROCEDURE — 258N000003 HC RX IP 258 OP 636

## 2023-11-29 PROCEDURE — 258N000003 HC RX IP 258 OP 636: Performed by: STUDENT IN AN ORGANIZED HEALTH CARE EDUCATION/TRAINING PROGRAM

## 2023-11-29 PROCEDURE — 710N000010 HC RECOVERY PHASE 1, LEVEL 2, PER MIN: Performed by: DENTIST

## 2023-11-29 PROCEDURE — 250N000009 HC RX 250: Performed by: STUDENT IN AN ORGANIZED HEALTH CARE EDUCATION/TRAINING PROGRAM

## 2023-11-29 PROCEDURE — 250N000011 HC RX IP 250 OP 636

## 2023-11-29 PROCEDURE — C1713 ANCHOR/SCREW BN/BN,TIS/BN: HCPCS | Performed by: DENTIST

## 2023-11-29 PROCEDURE — 250N000013 HC RX MED GY IP 250 OP 250 PS 637

## 2023-11-29 PROCEDURE — 250N000011 HC RX IP 250 OP 636: Performed by: ANESTHESIOLOGY

## 2023-11-29 PROCEDURE — 0NUR0KZ SUPPLEMENT MAXILLA WITH NONAUTOLOGOUS TISSUE SUBSTITUTE, OPEN APPROACH: ICD-10-PCS | Performed by: DENTIST

## 2023-11-29 PROCEDURE — 250N000013 HC RX MED GY IP 250 OP 250 PS 637: Performed by: DENTIST

## 2023-11-29 PROCEDURE — 250N000009 HC RX 250

## 2023-11-29 PROCEDURE — C1763 CONN TISS, NON-HUMAN: HCPCS | Performed by: DENTIST

## 2023-11-29 PROCEDURE — 360N000077 HC SURGERY LEVEL 4, PER MIN: Performed by: DENTIST

## 2023-11-29 PROCEDURE — 272N000002 HC OR SUPPLY OTHER OPNP: Performed by: DENTIST

## 2023-11-29 PROCEDURE — 272N000001 HC OR GENERAL SUPPLY STERILE: Performed by: DENTIST

## 2023-11-29 PROCEDURE — 250N000013 HC RX MED GY IP 250 OP 250 PS 637: Performed by: STUDENT IN AN ORGANIZED HEALTH CARE EDUCATION/TRAINING PROGRAM

## 2023-11-29 PROCEDURE — 370N000017 HC ANESTHESIA TECHNICAL FEE, PER MIN: Performed by: DENTIST

## 2023-11-29 PROCEDURE — 0NST04Z REPOSITION RIGHT MANDIBLE WITH INTERNAL FIXATION DEVICE, OPEN APPROACH: ICD-10-PCS | Performed by: DENTIST

## 2023-11-29 PROCEDURE — 36415 COLL VENOUS BLD VENIPUNCTURE: CPT | Performed by: ANESTHESIOLOGY

## 2023-11-29 PROCEDURE — 120N000002 HC R&B MED SURG/OB UMMC

## 2023-11-29 PROCEDURE — 250N000009 HC RX 250: Performed by: DENTIST

## 2023-11-29 PROCEDURE — 0NSR04Z REPOSITION MAXILLA WITH INTERNAL FIXATION DEVICE, OPEN APPROACH: ICD-10-PCS | Performed by: DENTIST

## 2023-11-29 PROCEDURE — 250N000011 HC RX IP 250 OP 636: Performed by: STUDENT IN AN ORGANIZED HEALTH CARE EDUCATION/TRAINING PROGRAM

## 2023-11-29 PROCEDURE — 250N000025 HC SEVOFLURANE, PER MIN: Performed by: DENTIST

## 2023-11-29 PROCEDURE — 0NSV04Z REPOSITION LEFT MANDIBLE WITH INTERNAL FIXATION DEVICE, OPEN APPROACH: ICD-10-PCS | Performed by: DENTIST

## 2023-11-29 PROCEDURE — 86850 RBC ANTIBODY SCREEN: CPT | Performed by: ANESTHESIOLOGY

## 2023-11-29 DEVICE — TRUMATCH ORTHOGNATHICS KIT FULL MAXILLARY: Type: IMPLANTABLE DEVICE | Site: MAXILLA | Status: FUNCTIONAL

## 2023-11-29 DEVICE — IMP SCR SYN MATRIX COARSE PITCH 1.85X14MM ST 04.511.214.01: Type: IMPLANTABLE DEVICE | Site: MANDIBLE | Status: FUNCTIONAL

## 2023-11-29 DEVICE — IMP SCR SYN MATRIX 1.85X6MM SELF DRILL 04.511.226.01: Type: IMPLANTABLE DEVICE | Site: MAXILLA | Status: FUNCTIONAL

## 2023-11-29 DEVICE — IMPLANTABLE DEVICE: Type: IMPLANTABLE DEVICE | Site: MANDIBLE | Status: FUNCTIONAL

## 2023-11-29 DEVICE — IMPLANTABLE DEVICE: Type: IMPLANTABLE DEVICE | Site: MAXILLA | Status: FUNCTIONAL

## 2023-11-29 DEVICE — IMP SCR SYN MATRIX COARSE PITCH 1.85X16MM ST 04.511.216.01: Type: IMPLANTABLE DEVICE | Site: MANDIBLE | Status: FUNCTIONAL

## 2023-11-29 DEVICE — GRAFT BONE VIVIGEN SM  5ML BL-1600-001: Type: IMPLANTABLE DEVICE | Site: MAXILLA | Status: FUNCTIONAL

## 2023-11-29 RX ORDER — OXYMETAZOLINE HYDROCHLORIDE 0.05 G/100ML
2 SPRAY NASAL 2 TIMES DAILY
Qty: 1.2 ML | Refills: 0 | Status: SHIPPED | OUTPATIENT
Start: 2023-11-29 | End: 2023-12-02

## 2023-11-29 RX ORDER — SCOLOPAMINE TRANSDERMAL SYSTEM 1 MG/1
1 PATCH, EXTENDED RELEASE TRANSDERMAL ONCE
Status: COMPLETED | OUTPATIENT
Start: 2023-11-29 | End: 2023-11-30

## 2023-11-29 RX ORDER — LIDOCAINE 40 MG/G
CREAM TOPICAL
Status: DISCONTINUED | OUTPATIENT
Start: 2023-11-29 | End: 2023-12-01 | Stop reason: HOSPADM

## 2023-11-29 RX ORDER — ACETAMINOPHEN 325 MG/10.15ML
650 LIQUID ORAL EVERY 4 HOURS PRN
Status: DISCONTINUED | OUTPATIENT
Start: 2023-11-29 | End: 2023-11-30

## 2023-11-29 RX ORDER — IBUPROFEN 100 MG/5ML
600 SUSPENSION, ORAL (FINAL DOSE FORM) ORAL EVERY 6 HOURS
Status: DISCONTINUED | OUTPATIENT
Start: 2023-11-29 | End: 2023-12-01 | Stop reason: HOSPADM

## 2023-11-29 RX ORDER — ONDANSETRON 4 MG/1
4 TABLET, ORALLY DISINTEGRATING ORAL EVERY 8 HOURS PRN
Qty: 12 TABLET | Refills: 0 | Status: SHIPPED | OUTPATIENT
Start: 2023-11-29 | End: 2024-05-21

## 2023-11-29 RX ORDER — ACETAMINOPHEN 325 MG/1
975 TABLET ORAL ONCE
Status: DISCONTINUED | OUTPATIENT
Start: 2023-11-29 | End: 2023-11-29 | Stop reason: HOSPADM

## 2023-11-29 RX ORDER — BENZOCAINE/MENTHOL 6 MG-10 MG
LOZENGE MUCOUS MEMBRANE EVERY 6 HOURS PRN
Qty: 14.2 G | Refills: 0 | Status: SHIPPED | OUTPATIENT
Start: 2023-11-29 | End: 2024-05-21

## 2023-11-29 RX ORDER — CEFAZOLIN SODIUM/WATER 2 G/20 ML
2 SYRINGE (ML) INTRAVENOUS
Status: COMPLETED | OUTPATIENT
Start: 2023-11-29 | End: 2023-11-29

## 2023-11-29 RX ORDER — ONDANSETRON 4 MG/1
4 TABLET, ORALLY DISINTEGRATING ORAL EVERY 30 MIN PRN
Status: DISCONTINUED | OUTPATIENT
Start: 2023-11-29 | End: 2023-11-29 | Stop reason: HOSPADM

## 2023-11-29 RX ORDER — CYCLOBENZAPRINE HCL 5 MG
5 TABLET ORAL EVERY 8 HOURS PRN
Status: DISCONTINUED | OUTPATIENT
Start: 2023-11-29 | End: 2023-12-01 | Stop reason: HOSPADM

## 2023-11-29 RX ORDER — FENTANYL CITRATE 50 UG/ML
50 INJECTION, SOLUTION INTRAMUSCULAR; INTRAVENOUS EVERY 5 MIN PRN
Status: DISCONTINUED | OUTPATIENT
Start: 2023-11-29 | End: 2023-11-29 | Stop reason: HOSPADM

## 2023-11-29 RX ORDER — AMPICILLIN AND SULBACTAM 2; 1 G/1; G/1
3 INJECTION, POWDER, FOR SOLUTION INTRAMUSCULAR; INTRAVENOUS EVERY 6 HOURS
Status: DISCONTINUED | OUTPATIENT
Start: 2023-11-29 | End: 2023-12-01 | Stop reason: HOSPADM

## 2023-11-29 RX ORDER — MELOXICAM 7.5 MG/1
15 TABLET ORAL ONCE
Status: COMPLETED | OUTPATIENT
Start: 2023-11-29 | End: 2023-11-29

## 2023-11-29 RX ORDER — SODIUM CHLORIDE, SODIUM LACTATE, POTASSIUM CHLORIDE, CALCIUM CHLORIDE 600; 310; 30; 20 MG/100ML; MG/100ML; MG/100ML; MG/100ML
INJECTION, SOLUTION INTRAVENOUS CONTINUOUS
Status: DISCONTINUED | OUTPATIENT
Start: 2023-11-29 | End: 2023-11-29 | Stop reason: HOSPADM

## 2023-11-29 RX ORDER — GABAPENTIN 250 MG/5ML
100 SOLUTION ORAL 3 TIMES DAILY
Qty: 180 ML | Refills: 0 | Status: SHIPPED | OUTPATIENT
Start: 2023-11-29 | End: 2024-05-21

## 2023-11-29 RX ORDER — HYDRALAZINE HYDROCHLORIDE 20 MG/ML
10 INJECTION INTRAMUSCULAR; INTRAVENOUS EVERY 6 HOURS PRN
Status: DISCONTINUED | OUTPATIENT
Start: 2023-11-29 | End: 2023-12-01 | Stop reason: HOSPADM

## 2023-11-29 RX ORDER — PROPOFOL 10 MG/ML
INJECTION, EMULSION INTRAVENOUS PRN
Status: DISCONTINUED | OUTPATIENT
Start: 2023-11-29 | End: 2023-11-30

## 2023-11-29 RX ORDER — FENTANYL CITRATE 50 UG/ML
INJECTION, SOLUTION INTRAMUSCULAR; INTRAVENOUS PRN
Status: DISCONTINUED | OUTPATIENT
Start: 2023-11-29 | End: 2023-11-30

## 2023-11-29 RX ORDER — LABETALOL HYDROCHLORIDE 5 MG/ML
10 INJECTION, SOLUTION INTRAVENOUS EVERY 4 HOURS PRN
Status: DISCONTINUED | OUTPATIENT
Start: 2023-11-29 | End: 2023-12-01 | Stop reason: HOSPADM

## 2023-11-29 RX ORDER — SODIUM CHLORIDE, SODIUM LACTATE, POTASSIUM CHLORIDE, CALCIUM CHLORIDE 600; 310; 30; 20 MG/100ML; MG/100ML; MG/100ML; MG/100ML
INJECTION, SOLUTION INTRAVENOUS CONTINUOUS
Status: DISCONTINUED | OUTPATIENT
Start: 2023-11-29 | End: 2023-11-30

## 2023-11-29 RX ORDER — NALOXONE HYDROCHLORIDE 0.4 MG/ML
0.2 INJECTION, SOLUTION INTRAMUSCULAR; INTRAVENOUS; SUBCUTANEOUS
Status: DISCONTINUED | OUTPATIENT
Start: 2023-11-29 | End: 2023-12-01 | Stop reason: HOSPADM

## 2023-11-29 RX ORDER — KETOROLAC TROMETHAMINE 30 MG/ML
INJECTION, SOLUTION INTRAMUSCULAR; INTRAVENOUS PRN
Status: DISCONTINUED | OUTPATIENT
Start: 2023-11-29 | End: 2023-11-30

## 2023-11-29 RX ORDER — HYDROMORPHONE HCL IN WATER/PF 6 MG/30 ML
0.2 PATIENT CONTROLLED ANALGESIA SYRINGE INTRAVENOUS EVERY 5 MIN PRN
Status: DISCONTINUED | OUTPATIENT
Start: 2023-11-29 | End: 2023-11-29 | Stop reason: HOSPADM

## 2023-11-29 RX ORDER — ONDANSETRON 2 MG/ML
4 INJECTION INTRAMUSCULAR; INTRAVENOUS EVERY 6 HOURS PRN
Status: DISCONTINUED | OUTPATIENT
Start: 2023-11-29 | End: 2023-12-01 | Stop reason: HOSPADM

## 2023-11-29 RX ORDER — DEXAMETHASONE SODIUM PHOSPHATE 4 MG/ML
8 INJECTION, SOLUTION INTRA-ARTICULAR; INTRALESIONAL; INTRAMUSCULAR; INTRAVENOUS; SOFT TISSUE EVERY 8 HOURS
Qty: 3 ML | Refills: 0 | Status: COMPLETED | OUTPATIENT
Start: 2023-11-29 | End: 2023-11-30

## 2023-11-29 RX ORDER — CHLORHEXIDINE GLUCONATE ORAL RINSE 1.2 MG/ML
10 SOLUTION DENTAL ONCE
Status: COMPLETED | OUTPATIENT
Start: 2023-11-29 | End: 2023-11-29

## 2023-11-29 RX ORDER — LABETALOL HYDROCHLORIDE 5 MG/ML
10 INJECTION, SOLUTION INTRAVENOUS ONCE
Status: COMPLETED | OUTPATIENT
Start: 2023-11-29 | End: 2023-11-29

## 2023-11-29 RX ORDER — CHLORHEXIDINE GLUCONATE ORAL RINSE 1.2 MG/ML
SOLUTION DENTAL PRN
Status: DISCONTINUED | OUTPATIENT
Start: 2023-11-29 | End: 2023-11-29 | Stop reason: HOSPADM

## 2023-11-29 RX ORDER — NALOXONE HYDROCHLORIDE 0.4 MG/ML
0.4 INJECTION, SOLUTION INTRAMUSCULAR; INTRAVENOUS; SUBCUTANEOUS
Status: DISCONTINUED | OUTPATIENT
Start: 2023-11-29 | End: 2023-12-01 | Stop reason: HOSPADM

## 2023-11-29 RX ORDER — LABETALOL 20 MG/4 ML (5 MG/ML) INTRAVENOUS SYRINGE
PRN
Status: DISCONTINUED | OUTPATIENT
Start: 2023-11-29 | End: 2023-11-30

## 2023-11-29 RX ORDER — DEXMEDETOMIDINE HYDROCHLORIDE 4 UG/ML
INJECTION, SOLUTION INTRAVENOUS PRN
Status: DISCONTINUED | OUTPATIENT
Start: 2023-11-29 | End: 2023-11-30

## 2023-11-29 RX ORDER — BUPIVACAINE HYDROCHLORIDE AND EPINEPHRINE 2.5; 5 MG/ML; UG/ML
INJECTION, SOLUTION INFILTRATION; PERINEURAL PRN
Status: DISCONTINUED | OUTPATIENT
Start: 2023-11-29 | End: 2023-11-29 | Stop reason: HOSPADM

## 2023-11-29 RX ORDER — BISACODYL 10 MG
10 SUPPOSITORY, RECTAL RECTAL DAILY PRN
Status: DISCONTINUED | OUTPATIENT
Start: 2023-11-29 | End: 2023-12-01 | Stop reason: HOSPADM

## 2023-11-29 RX ORDER — IBUPROFEN 100 MG/5ML
600 SUSPENSION, ORAL (FINAL DOSE FORM) ORAL EVERY 6 HOURS
Qty: 473 ML | Refills: 0 | Status: SHIPPED | OUTPATIENT
Start: 2023-11-29 | End: 2024-05-21

## 2023-11-29 RX ORDER — LIDOCAINE HYDROCHLORIDE 20 MG/ML
INJECTION, SOLUTION INFILTRATION; PERINEURAL PRN
Status: DISCONTINUED | OUTPATIENT
Start: 2023-11-29 | End: 2023-11-30

## 2023-11-29 RX ORDER — ECHINACEA PURPUREA EXTRACT 125 MG
TABLET ORAL
Qty: 88 ML | Refills: 0 | Status: SHIPPED | OUTPATIENT
Start: 2023-11-29 | End: 2024-05-21

## 2023-11-29 RX ORDER — OXYCODONE HCL 5 MG/5 ML
5 SOLUTION, ORAL ORAL EVERY 6 HOURS PRN
Qty: 60 ML | Refills: 0 | Status: SHIPPED | OUTPATIENT
Start: 2023-11-29 | End: 2023-12-01

## 2023-11-29 RX ORDER — ACETAMINOPHEN 160 MG/5ML
640 LIQUID ORAL EVERY 6 HOURS
Qty: 473 ML | Refills: 0 | Status: SHIPPED | OUTPATIENT
Start: 2023-11-29 | End: 2024-05-21

## 2023-11-29 RX ORDER — OXYMETAZOLINE HYDROCHLORIDE 0.05 G/100ML
2 SPRAY NASAL 2 TIMES DAILY PRN
Status: DISCONTINUED | OUTPATIENT
Start: 2023-11-29 | End: 2023-12-01 | Stop reason: HOSPADM

## 2023-11-29 RX ORDER — PROCHLORPERAZINE MALEATE 10 MG
10 TABLET ORAL EVERY 6 HOURS PRN
Status: DISCONTINUED | OUTPATIENT
Start: 2023-11-29 | End: 2023-12-01 | Stop reason: HOSPADM

## 2023-11-29 RX ORDER — CEFAZOLIN SODIUM/WATER 2 G/20 ML
2 SYRINGE (ML) INTRAVENOUS SEE ADMIN INSTRUCTIONS
Status: DISCONTINUED | OUTPATIENT
Start: 2023-11-29 | End: 2023-11-29 | Stop reason: HOSPADM

## 2023-11-29 RX ORDER — HYDROMORPHONE HCL IN WATER/PF 6 MG/30 ML
0.2 PATIENT CONTROLLED ANALGESIA SYRINGE INTRAVENOUS
Status: DISCONTINUED | OUTPATIENT
Start: 2023-11-29 | End: 2023-11-30

## 2023-11-29 RX ORDER — AMOXICILLIN AND CLAVULANATE POTASSIUM 400; 57 MG/5ML; MG/5ML
875 POWDER, FOR SUSPENSION ORAL 2 TIMES DAILY
Qty: 153.16 ML | Refills: 0 | Status: SHIPPED | OUTPATIENT
Start: 2023-11-29 | End: 2023-12-06

## 2023-11-29 RX ORDER — CHLORHEXIDINE GLUCONATE ORAL RINSE 1.2 MG/ML
15 SOLUTION DENTAL 2 TIMES DAILY
Qty: 473 ML | Refills: 0 | Status: SHIPPED | OUTPATIENT
Start: 2023-11-29 | End: 2024-05-21

## 2023-11-29 RX ORDER — BENZOCAINE/MENTHOL 6 MG-10 MG
LOZENGE MUCOUS MEMBRANE EVERY 4 HOURS PRN
Status: DISCONTINUED | OUTPATIENT
Start: 2023-11-29 | End: 2023-12-01 | Stop reason: HOSPADM

## 2023-11-29 RX ORDER — SODIUM CHLORIDE, SODIUM LACTATE, POTASSIUM CHLORIDE, CALCIUM CHLORIDE 600; 310; 30; 20 MG/100ML; MG/100ML; MG/100ML; MG/100ML
INJECTION, SOLUTION INTRAVENOUS CONTINUOUS PRN
Status: DISCONTINUED | OUTPATIENT
Start: 2023-11-29 | End: 2023-11-30

## 2023-11-29 RX ORDER — DEXAMETHASONE SODIUM PHOSPHATE 4 MG/ML
INJECTION, SOLUTION INTRA-ARTICULAR; INTRALESIONAL; INTRAMUSCULAR; INTRAVENOUS; SOFT TISSUE PRN
Status: DISCONTINUED | OUTPATIENT
Start: 2023-11-29 | End: 2023-11-30

## 2023-11-29 RX ORDER — PSEUDOEPHEDRINE HCL 60 MG
60 TABLET ORAL EVERY 6 HOURS
Qty: 12 TABLET | Refills: 0 | Status: SHIPPED | OUTPATIENT
Start: 2023-11-29 | End: 2023-12-02

## 2023-11-29 RX ORDER — ONDANSETRON 4 MG/1
4 TABLET, ORALLY DISINTEGRATING ORAL EVERY 6 HOURS PRN
Status: DISCONTINUED | OUTPATIENT
Start: 2023-11-29 | End: 2023-12-01 | Stop reason: HOSPADM

## 2023-11-29 RX ORDER — PSEUDOEPHEDRINE HCL 60 MG
60 TABLET ORAL EVERY 6 HOURS PRN
Status: DISCONTINUED | OUTPATIENT
Start: 2023-11-29 | End: 2023-12-01 | Stop reason: HOSPADM

## 2023-11-29 RX ORDER — HYDRALAZINE HYDROCHLORIDE 20 MG/ML
10 INJECTION INTRAMUSCULAR; INTRAVENOUS EVERY 6 HOURS PRN
Status: DISCONTINUED | OUTPATIENT
Start: 2023-11-29 | End: 2023-11-29

## 2023-11-29 RX ORDER — TRANEXAMIC ACID 10 MG/ML
1 INJECTION, SOLUTION INTRAVENOUS ONCE
Status: COMPLETED | OUTPATIENT
Start: 2023-11-29 | End: 2023-11-29

## 2023-11-29 RX ORDER — FENTANYL CITRATE 50 UG/ML
25 INJECTION, SOLUTION INTRAMUSCULAR; INTRAVENOUS EVERY 5 MIN PRN
Status: DISCONTINUED | OUTPATIENT
Start: 2023-11-29 | End: 2023-11-29 | Stop reason: HOSPADM

## 2023-11-29 RX ORDER — CHLORHEXIDINE GLUCONATE ORAL RINSE 1.2 MG/ML
15 SOLUTION DENTAL 2 TIMES DAILY
Status: DISCONTINUED | OUTPATIENT
Start: 2023-11-29 | End: 2023-12-01 | Stop reason: HOSPADM

## 2023-11-29 RX ORDER — GABAPENTIN 250 MG/5ML
300 SOLUTION ORAL EVERY 8 HOURS SCHEDULED
Status: DISCONTINUED | OUTPATIENT
Start: 2023-11-29 | End: 2023-12-01 | Stop reason: HOSPADM

## 2023-11-29 RX ORDER — ACETAMINOPHEN 325 MG/10.15ML
650 LIQUID ORAL EVERY 6 HOURS
Status: DISCONTINUED | OUTPATIENT
Start: 2023-11-29 | End: 2023-11-29

## 2023-11-29 RX ORDER — LABETALOL HYDROCHLORIDE 5 MG/ML
10 INJECTION, SOLUTION INTRAVENOUS EVERY 4 HOURS PRN
Status: DISCONTINUED | OUTPATIENT
Start: 2023-11-29 | End: 2023-11-29

## 2023-11-29 RX ORDER — ONDANSETRON 2 MG/ML
4 INJECTION INTRAMUSCULAR; INTRAVENOUS EVERY 30 MIN PRN
Status: DISCONTINUED | OUTPATIENT
Start: 2023-11-29 | End: 2023-11-29 | Stop reason: HOSPADM

## 2023-11-29 RX ORDER — LABETALOL HYDROCHLORIDE 5 MG/ML
5 INJECTION, SOLUTION INTRAVENOUS ONCE
Status: DISCONTINUED | OUTPATIENT
Start: 2023-11-29 | End: 2023-11-29 | Stop reason: HOSPADM

## 2023-11-29 RX ORDER — NICARDIPINE HCL-0.9% SOD CHLOR 1 MG/10 ML
SYRINGE (ML) INTRAVENOUS PRN
Status: DISCONTINUED | OUTPATIENT
Start: 2023-11-29 | End: 2023-11-30

## 2023-11-29 RX ORDER — LIDOCAINE 40 MG/G
CREAM TOPICAL
Status: DISCONTINUED | OUTPATIENT
Start: 2023-11-29 | End: 2023-11-29 | Stop reason: HOSPADM

## 2023-11-29 RX ORDER — OXYCODONE HCL 5 MG/5 ML
5-10 SOLUTION, ORAL ORAL EVERY 6 HOURS PRN
Status: DISCONTINUED | OUTPATIENT
Start: 2023-11-29 | End: 2023-11-29

## 2023-11-29 RX ORDER — ONDANSETRON 2 MG/ML
INJECTION INTRAMUSCULAR; INTRAVENOUS PRN
Status: DISCONTINUED | OUTPATIENT
Start: 2023-11-29 | End: 2023-11-30

## 2023-11-29 RX ORDER — ACETAMINOPHEN 325 MG/1
975 TABLET ORAL ONCE
Status: COMPLETED | OUTPATIENT
Start: 2023-11-29 | End: 2023-11-29

## 2023-11-29 RX ORDER — AMOXICILLIN 250 MG
1 CAPSULE ORAL 2 TIMES DAILY
Status: DISCONTINUED | OUTPATIENT
Start: 2023-11-29 | End: 2023-12-01 | Stop reason: HOSPADM

## 2023-11-29 RX ORDER — HYDROMORPHONE HCL IN WATER/PF 6 MG/30 ML
0.4 PATIENT CONTROLLED ANALGESIA SYRINGE INTRAVENOUS EVERY 5 MIN PRN
Status: DISCONTINUED | OUTPATIENT
Start: 2023-11-29 | End: 2023-11-29 | Stop reason: HOSPADM

## 2023-11-29 RX ORDER — CYCLOBENZAPRINE HCL 5 MG
5 TABLET ORAL EVERY 8 HOURS PRN
Status: DISCONTINUED | OUTPATIENT
Start: 2023-11-29 | End: 2023-11-29

## 2023-11-29 RX ORDER — POLYETHYLENE GLYCOL 3350 17 G/17G
17 POWDER, FOR SOLUTION ORAL DAILY
Status: DISCONTINUED | OUTPATIENT
Start: 2023-11-30 | End: 2023-12-01 | Stop reason: HOSPADM

## 2023-11-29 RX ORDER — GABAPENTIN 300 MG/1
300 CAPSULE ORAL ONCE
Status: COMPLETED | OUTPATIENT
Start: 2023-11-29 | End: 2023-11-29

## 2023-11-29 RX ORDER — OXYCODONE HCL 5 MG/5 ML
5-10 SOLUTION, ORAL ORAL EVERY 4 HOURS PRN
Status: DISCONTINUED | OUTPATIENT
Start: 2023-11-29 | End: 2023-12-01 | Stop reason: HOSPADM

## 2023-11-29 RX ORDER — HYDROMORPHONE HCL IN WATER/PF 6 MG/30 ML
0.4 PATIENT CONTROLLED ANALGESIA SYRINGE INTRAVENOUS
Status: DISCONTINUED | OUTPATIENT
Start: 2023-11-29 | End: 2023-11-30

## 2023-11-29 RX ADMIN — ONDANSETRON 4 MG: 2 INJECTION INTRAMUSCULAR; INTRAVENOUS at 12:27

## 2023-11-29 RX ADMIN — Medication 2 G: at 08:07

## 2023-11-29 RX ADMIN — SODIUM CHLORIDE, POTASSIUM CHLORIDE, SODIUM LACTATE AND CALCIUM CHLORIDE: 600; 310; 30; 20 INJECTION, SOLUTION INTRAVENOUS at 07:58

## 2023-11-29 RX ADMIN — KETOROLAC TROMETHAMINE 30 MG: 30 INJECTION, SOLUTION INTRAMUSCULAR at 12:46

## 2023-11-29 RX ADMIN — TRANEXAMIC ACID 1 G: 10 INJECTION, SOLUTION INTRAVENOUS at 08:27

## 2023-11-29 RX ADMIN — SUGAMMADEX 100 MG: 100 INJECTION, SOLUTION INTRAVENOUS at 13:14

## 2023-11-29 RX ADMIN — CHLORHEXIDINE GLUCONATE 10 ML: 1.2 SOLUTION ORAL at 06:55

## 2023-11-29 RX ADMIN — SUGAMMADEX 100 MG: 100 INJECTION, SOLUTION INTRAVENOUS at 13:01

## 2023-11-29 RX ADMIN — Medication 100 MCG: at 08:41

## 2023-11-29 RX ADMIN — ACETAMINOPHEN 975 MG: 325 TABLET, FILM COATED ORAL at 06:54

## 2023-11-29 RX ADMIN — FENTANYL CITRATE 50 MCG: 50 INJECTION, SOLUTION INTRAMUSCULAR; INTRAVENOUS at 14:11

## 2023-11-29 RX ADMIN — GABAPENTIN 300 MG: 300 CAPSULE ORAL at 06:54

## 2023-11-29 RX ADMIN — Medication 0.5 MG: at 11:29

## 2023-11-29 RX ADMIN — FENTANYL CITRATE 50 MCG: 50 INJECTION INTRAMUSCULAR; INTRAVENOUS at 08:31

## 2023-11-29 RX ADMIN — OXYCODONE HYDROCHLORIDE 5 MG: 5 SOLUTION ORAL at 19:51

## 2023-11-29 RX ADMIN — CHLORHEXIDINE GLUCONATE 15 ML: 1.2 SOLUTION ORAL at 19:53

## 2023-11-29 RX ADMIN — LABETALOL HYDROCHLORIDE 10 MG: 5 INJECTION, SOLUTION INTRAVENOUS at 20:16

## 2023-11-29 RX ADMIN — Medication 20 MCG: at 12:13

## 2023-11-29 RX ADMIN — HYDROMORPHONE HYDROCHLORIDE 0.2 MG: 0.2 INJECTION, SOLUTION INTRAMUSCULAR; INTRAVENOUS; SUBCUTANEOUS at 16:51

## 2023-11-29 RX ADMIN — SENNOSIDES AND DOCUSATE SODIUM 1 TABLET: 8.6; 5 TABLET ORAL at 20:17

## 2023-11-29 RX ADMIN — LABETALOL 20 MG/4 ML (5 MG/ML) INTRAVENOUS SYRINGE 5 MG: at 09:05

## 2023-11-29 RX ADMIN — Medication 100 MCG: at 09:10

## 2023-11-29 RX ADMIN — PROPOFOL 200 MG: 10 INJECTION, EMULSION INTRAVENOUS at 07:47

## 2023-11-29 RX ADMIN — SODIUM CHLORIDE, POTASSIUM CHLORIDE, SODIUM LACTATE AND CALCIUM CHLORIDE: 600; 310; 30; 20 INJECTION, SOLUTION INTRAVENOUS at 12:48

## 2023-11-29 RX ADMIN — LABETALOL HYDROCHLORIDE 10 MG: 5 INJECTION, SOLUTION INTRAVENOUS at 23:17

## 2023-11-29 RX ADMIN — DEXAMETHASONE SODIUM PHOSPHATE 8 MG: 4 INJECTION, SOLUTION INTRA-ARTICULAR; INTRALESIONAL; INTRAMUSCULAR; INTRAVENOUS; SOFT TISSUE at 08:14

## 2023-11-29 RX ADMIN — HYDROMORPHONE HYDROCHLORIDE 0.5 MG: 1 INJECTION, SOLUTION INTRAMUSCULAR; INTRAVENOUS; SUBCUTANEOUS at 11:05

## 2023-11-29 RX ADMIN — Medication 200 MCG: at 09:20

## 2023-11-29 RX ADMIN — Medication 0.5 MG: at 10:45

## 2023-11-29 RX ADMIN — IBUPROFEN 600 MG: 200 SUSPENSION ORAL at 15:50

## 2023-11-29 RX ADMIN — AMPICILLIN SODIUM AND SULBACTAM SODIUM 3 G: 2; 1 INJECTION, POWDER, FOR SOLUTION INTRAMUSCULAR; INTRAVENOUS at 22:10

## 2023-11-29 RX ADMIN — Medication 10 MG: at 11:50

## 2023-11-29 RX ADMIN — Medication 20 MG: at 09:23

## 2023-11-29 RX ADMIN — OXYMETAZOLINE HYDROCHLORIDE 2 SPRAY: 0.05 SPRAY NASAL at 23:27

## 2023-11-29 RX ADMIN — LABETALOL 20 MG/4 ML (5 MG/ML) INTRAVENOUS SYRINGE 10 MG: at 09:12

## 2023-11-29 RX ADMIN — Medication 100 MCG: at 09:13

## 2023-11-29 RX ADMIN — FENTANYL CITRATE 50 MCG: 50 INJECTION INTRAMUSCULAR; INTRAVENOUS at 10:57

## 2023-11-29 RX ADMIN — HYDROMORPHONE HYDROCHLORIDE 0.2 MG: 0.2 INJECTION, SOLUTION INTRAMUSCULAR; INTRAVENOUS; SUBCUTANEOUS at 15:17

## 2023-11-29 RX ADMIN — FENTANYL CITRATE 50 MCG: 50 INJECTION INTRAMUSCULAR; INTRAVENOUS at 09:23

## 2023-11-29 RX ADMIN — IBUPROFEN 600 MG: 200 SUSPENSION ORAL at 22:09

## 2023-11-29 RX ADMIN — OXYCODONE HYDROCHLORIDE 5 MG: 5 SOLUTION ORAL at 22:32

## 2023-11-29 RX ADMIN — LABETALOL 20 MG/4 ML (5 MG/ML) INTRAVENOUS SYRINGE 5 MG: at 09:26

## 2023-11-29 RX ADMIN — DEXAMETHASONE SODIUM PHOSPHATE 8 MG: 4 INJECTION, SOLUTION INTRA-ARTICULAR; INTRALESIONAL; INTRAMUSCULAR; INTRAVENOUS; SOFT TISSUE at 23:42

## 2023-11-29 RX ADMIN — PROPOFOL 40 MG: 10 INJECTION, EMULSION INTRAVENOUS at 09:15

## 2023-11-29 RX ADMIN — FENTANYL CITRATE 100 MCG: 50 INJECTION INTRAMUSCULAR; INTRAVENOUS at 07:54

## 2023-11-29 RX ADMIN — Medication 200 MCG: at 09:16

## 2023-11-29 RX ADMIN — HYDRALAZINE HYDROCHLORIDE 10 MG: 20 INJECTION INTRAMUSCULAR; INTRAVENOUS at 22:08

## 2023-11-29 RX ADMIN — Medication 60 MG: at 07:48

## 2023-11-29 RX ADMIN — GABAPENTIN 300 MG: 250 SOLUTION ORAL at 22:09

## 2023-11-29 RX ADMIN — Medication 20 MG: at 10:53

## 2023-11-29 RX ADMIN — GABAPENTIN 300 MG: 250 SOLUTION ORAL at 18:12

## 2023-11-29 RX ADMIN — AMPICILLIN SODIUM AND SULBACTAM SODIUM 3 G: 2; 1 INJECTION, POWDER, FOR SOLUTION INTRAMUSCULAR; INTRAVENOUS at 15:48

## 2023-11-29 RX ADMIN — LABETALOL 20 MG/4 ML (5 MG/ML) INTRAVENOUS SYRINGE 5 MG: at 08:46

## 2023-11-29 RX ADMIN — ACETAMINOPHEN 650 MG: 325 SOLUTION ORAL at 18:12

## 2023-11-29 RX ADMIN — HYDROMORPHONE HYDROCHLORIDE 0.4 MG: 0.2 INJECTION, SOLUTION INTRAMUSCULAR; INTRAVENOUS; SUBCUTANEOUS at 23:37

## 2023-11-29 RX ADMIN — MELOXICAM 15 MG: 7.5 TABLET ORAL at 07:23

## 2023-11-29 RX ADMIN — Medication 2 G: at 12:07

## 2023-11-29 RX ADMIN — FENTANYL CITRATE 100 MCG: 50 INJECTION INTRAMUSCULAR; INTRAVENOUS at 07:45

## 2023-11-29 RX ADMIN — SODIUM CHLORIDE, POTASSIUM CHLORIDE, SODIUM LACTATE AND CALCIUM CHLORIDE: 600; 310; 30; 20 INJECTION, SOLUTION INTRAVENOUS at 15:55

## 2023-11-29 RX ADMIN — MIDAZOLAM 2 MG: 1 INJECTION INTRAMUSCULAR; INTRAVENOUS at 07:31

## 2023-11-29 RX ADMIN — NICARDIPINE HYDROCHLORIDE 2.5 MG/HR: 0.2 INJECTION, SOLUTION INTRAVENOUS at 09:10

## 2023-11-29 RX ADMIN — SCOPALAMINE 1 PATCH: 1 PATCH, EXTENDED RELEASE TRANSDERMAL at 06:54

## 2023-11-29 RX ADMIN — FENTANYL CITRATE 25 MCG: 50 INJECTION, SOLUTION INTRAMUSCULAR; INTRAVENOUS at 14:24

## 2023-11-29 RX ADMIN — FENTANYL CITRATE 50 MCG: 50 INJECTION INTRAMUSCULAR; INTRAVENOUS at 10:06

## 2023-11-29 RX ADMIN — HYDROMORPHONE HYDROCHLORIDE 0.2 MG: 0.2 INJECTION, SOLUTION INTRAMUSCULAR; INTRAVENOUS; SUBCUTANEOUS at 19:12

## 2023-11-29 RX ADMIN — DEXAMETHASONE SODIUM PHOSPHATE 8 MG: 4 INJECTION, SOLUTION INTRA-ARTICULAR; INTRALESIONAL; INTRAMUSCULAR; INTRAVENOUS; SOFT TISSUE at 16:50

## 2023-11-29 RX ADMIN — SODIUM CHLORIDE, POTASSIUM CHLORIDE, SODIUM LACTATE AND CALCIUM CHLORIDE: 600; 310; 30; 20 INJECTION, SOLUTION INTRAVENOUS at 07:34

## 2023-11-29 RX ADMIN — FENTANYL CITRATE 25 MCG: 50 INJECTION, SOLUTION INTRAMUSCULAR; INTRAVENOUS at 13:56

## 2023-11-29 RX ADMIN — LIDOCAINE HYDROCHLORIDE 60 MG: 20 INJECTION, SOLUTION INFILTRATION; PERINEURAL at 07:46

## 2023-11-29 ASSESSMENT — ACTIVITIES OF DAILY LIVING (ADL)
ADLS_ACUITY_SCORE: 22

## 2023-11-29 NOTE — DISCHARGE INSTRUCTIONS
HOME CARE INSTRUCTIONS FOLLOWING ORTHOGNATHIC SURGERY    WOUNDS  The wounds in your mouth and skin should be left alone and undisturbed for the first 24 hours after surgery. Do not rinse your mouth or use a mouthwash for 3 days following surgery. Avoid probing the wound with anything and do not use a water-pick during your healing course. Trauma to incisions can result loosening of sutures and opening of wounds. If you smoke please refrain for as long as possible, up to a week (or forever) is beneficial to healing.    NAUSEA AND VOMITING   You may experience some nausea or vomiting after surgery.  It is important to realize that this is not a life threatening situation since your stomach is empty. Take medications for nausea that you were given upon discharge as needed to help with the nausea.    SWELLING  Swelling occurs after all surgeries. The degree of swelling is quite variable in different individuals.  More swelling usually occurs with the lower jaw surgery.  Swelling will continue to increase for approximately 48 to 72 hours following surgery, and then will resolve within 10 days to 2 weeks. We try to minimize your swelling with a medication but some swelling should be anticipated.  Also, ice packs may be applied to your face. You can reduce swelling by keeping your head elevated for the first week following surgery and by walking as soon as possible after surgery. The ointment should be used to keep your lips moist.    FOLLOWING SURGERY  It is common to experience minor bleeding both from the nose and mouth following surgery.  This generally stops at 24 to 48 hours but may continue for 7-10 days after surgery. Both your upper and lower lips will be numb from the surgery and may stay that way for a few months. Most patients recover all sensation to these areas but about 10-20% of patients will have some permanent lip numbness following surgery.     NASAL STUFFINESS AND SORE THROAT  Nasal stuffiness and a  sore throat can occur, both from the tubes placed during surgery and from surgery on the upper jaw.  When stuffiness occurs, it can be managed with a combination of cleansing of the nostrils and nasal sprays.  Nasal secretions can be removed by using cotton-tipped swabs soaked in a solution of hydrogen peroxide and water (one to three parts).    When it is necessary to use a decongestant nasal spray, squeeze the spray bottle with sufficient force for you to taste the medication.  This will provide relief in approximately 3 to 5 minutes, but nasal decongestant sprays should not be used for more than 4 days.  The nasal stuffiness will resolve within approximately 1-2 weeks following surgery.  Your sore throat should improve by the third or fourth day following surgery. If your throat is still bothering you more than a week after surgery please make us aware of this.     CLEAR LIQUIDS  It will be important that you drink a sufficient volume of fluids to allow the intravenous fluids to be discontinued as soon as possible after surgery.  An average adult requires 2 to 3 quarts of fluid every 24 hours.  While this may seem like a large quantity, it can be best achieved with constant sipping.  Most patients drink directly from a cup or glass using a straw.  However, for those who find this difficult, use the large catheter tipped syringe to assist you in taking fluids.    ELASTICS AFTER SURGERY  In select cases the jaws and teeth do not require elastics after surgery.  This is determined by the specific nature of your skeletal condition and will be decided by your surgeon.  When the teeth have elastics holding the jaws together, these will be in place for 1 to 3 weeks.  In rare instances the jaws will have to be in elastics for a longer period of time. Change the elastics when they are broken. Notify us if you run out of the extra elastics before your next follow up visit with us at our clinic.     WALKING  You are  "encouraged to begin to walk as soon as possible.    OCCLUSAL SPLINTS  In many cases a clear plastic splint is placed between your teeth at surgery.  A splint is a plastic (acrylic) device constructed from your dental models that have been placed into the new bite (occlusal relationship).  After the jaw surgery, the teeth are often wired together into the \"splint\" to establish and maintain the correct jaw position.  This will remain in place until the wires are removed, and in most instances, will be used for a time following the release of fixation, typically 3 weeks. Notify us if there are any signs of the occlusal splints moving or mobility of the occlusal splints.     SPEECH  The ease with which you can communicate and be understood is not predictable immediately after surgery.  Your speech will improve, however, by repeated attempts on your part to talk and be understood.  It is important that you slow your rate of speech, concentrate on each word, and be willing to try.  Most patients can be understood within 24 hours after surgery but speech may take time to adapt to new jaw position.    CLEANING THE TEETH WHEN THE JAWS ARE \"WIRED\" TOGETHER  You will be encouraged to brush your teeth following each meal.  A child-sized soft toothbrush can be utilized for this purpose, paying particular attention to keeping the brush in direct contact with the teeth.  In addition to brushing, a mouth rinse may be used.  The rinse is made by diluting hydrogen peroxide, 50:50, with any commercial mouth rinse.  Do not use a water irrigating device, such as \"water pic,\" until after 2 weeks following surgery.  These irrigating devices have sufficient force to tear open the sutures in your mouth.  A \"water pic,\" along with a toothbrush will, however, provide an excellent means of maintaining oral hygiene approximately 2 weeks following surgery.    POSTOPERATIVE PAIN  Pain must be anticipated.  In most instances, however, it is " "moderate and treated with medications. Every patient experiences pain differently and there is no one size fits all approach. Your surgical team will attempt to tailor your medications to best control your pain. When bone grafts are taken from the hip (or rib or skull) more discomfort should be anticipated.  Generally, no injections are needed for pain and a liquid medication is all that is required.    MEDICATIONS  You will usually be given antibiotics, nasal decongestants, nasal spray, ointment to keep your lips moist, and a pain medication if needed.  Most often these will be discontinued on discharge from the hospital, except for some of the medications, which will be used for 3 days to 2 weeks following surgery. Antibiotics are typically not prescribed after your hospital stay as you have getting them through the IV. An additional course of oral antibiotics may be prescribed but only if deemed necessary by the surgical team. The typical regiment of medications include pain medication, stool softener, nasal decongestants, mucolytics and anti-nausea medications. If you feel that you need additional medications please inform your surgical team. Below is a list of medications and the common usages    Common medications:  Hydrocodone/Oxycodone - Narcotic pain control  Acetominophen/Ibuprofen - Non-narcotic pain control  Amoxicillin/Clindamycin - Antibiotic, typically a 5-7 day course  Ondansetron/Phenergan/Compazine/Scopolamine - Anti-nausea medication  Guaufenesin - Mucolytic, used to keep mucous from hardening in nasal passages after upper jaw surgery  Sudafed/Claritin - Decongestant, used to keep sinuses clear after upper jaw surgery  Colace/Senna - Stool softener used while taking Narcotic pain medication      DIET  Strict full liquid, no chew diet for at least 6 weeks following the surgery. During the period of hospitalization we may ask a dietician will discuss \"at-home\" dietary management with you and members " "of your family.  This will include a diet booklet.  It is suggested that prior to your surgery, you acquire a  and a food strainer.  Commercial dietary supplements such as Ensure   may also be of assistance during the postoperative period; the dietician can suggest those that are recommended.  These can be purchased, without a prescription, in your local pharmacy.  Most have a variety of flavors.     WEIGHT LOSS  Again, a weight loss of 10 to 20 pounds may be anticipated during the postoperative period.  This is usually a reflection of a loss of appetite, rather than the fact that the teeth are \"wired\" together.  Within 2 weeks following surgery, your appetite should be sufficiently improved to maintain and possibly increase your weight.    ACTIVITIES  Light activities for at least 6 weeks following the surgeries. Avoid contact sports, avoid heavy lifting. Avoid activities or going to sport events that could increase risks of facial trauma (balls/sport instruments hitting to face).    MUSICAL ACTIVITIES   Avoid using blowing musical instruments for at least 6 weeks following surgery.     SINUS PRECAUTIONS  If you had LeFort surgery as part of your orthognathic surgery, apply strict sinus precautions for at least 6 weeks following surgery:     AVOID   - blowing your nose: It is best to wipe away nasal secretions carefully. After 2 weeks, if you must blow your nose, blow gently through both sides at the same time. Do not pinch your nose; do not blow just one side at a time.   - sneezing: If you must sneeze, keep your mouth open and do not pinch your nose closed.   - sucking: Do not drink through a straw. Do not smoke.   - blowing: Do not play a wind instrument. Do not blow up balloons.   - pushing or lifting: Do not lift or push objects weighing more than 10 pounds.   - bending over: Keep your head above the level of your heart. Sleep with your head slightly raised.     DAY OF DISCHARGE  Most patients are ready " for discharge 1 to 2 days after surgery.  You will be encouraged to resume your normal activities as soon as possible. Your discharge from the hospital will be dictated by four things. One, your ability to walk independently. Two, your ability to maintain adequate fluid intake to prevent dehydration. Three, your ability and urinate without difficulty and four, that your pain is controlled with medications taken by mouth. After your discharge you will be provided the contact information of the on-call Oral & Maxillofacial Surgery resident (24/7). This person is available for questions should any arise after discharge but please read through this entire document prior to calling. The answer may be in the document.     AFTER HOURS CONTACT FOR EMERGENCIES:  Please call the [facility] switchboard at [phone] and ask to have the Oral and Maxillofacial Surgery Resident On-call paged (24/7).    It is our desire to make your recovery as smooth as possible. These instructions are given to assist you following your surgery. If you have any questions please call the clinic at [phone] (during clinic hours).

## 2023-11-29 NOTE — OP NOTE
Oral and Maxillofacial Surgery  Operative Note    Preoperative Diagnosis  Maxillary hypoplasia     Postoperative Diagnosis  Same as previous      Procedure(s):  Lefort 1 Osteotomy with non-autogenous bone graft  Bilateral sagittal split ramus osteotomy with rigid fixation  Application of occlusal splints     Surgeon:  Betty Reyes DDS     Resident Surgeon(s):  JULIO CESAR Greene DDS     EBL:   200ml     Drains: None     Prosthetic Devices:   Implant Name Type Inv. Item Serial No.  Lot No. LRB No. Used Action   TRUMATCH ORTHOGNATHICS KIT FULL MAXILLARY - SNA Metallic Hardware/[place] TRUMATCH ORTHOGNATHICS KIT FULL MAXILLARY NA SYNTHES-STRATEC NA N/A 1 Implanted   IMP PLATE SYN MATRIX MIDFACE ADAPTER 20H 0.7MM [phone] - [employer]/[place] IMP PLATE SYN MATRIX MIDFACE ADAPTER 20H 0.7MM [phone] NA SYNTHES-STRATEC NA Right 1 Implanted   IMP SCR SYN MATRIX 1.85X6MM SELF DRILL [id number] - [employer]ware/[place] IMP SCR SYN MATRIX 1.85X6MM SELF DRILL [id number] NA SYNTHES-STRATEC NA N/A 17 Implanted   GRAFT BONE VIVIGEN SM  5ML [id number] - [id number] Bone/Tissue/Biologic GRAFT BONE VIVIGEN SM  5ML [id number] LIFENET  N/A 1 Implanted   IMP SCR SYN MATRIX COARSE PITCH [id number] ST [id number] - [employer]ware/[place] IMP SCR SYN MATRIX COARSE PITCH [id number] ST [id number] NA SYNTHES-STRATEC  N/A 2 Implanted   IMP SCR SYN MATRIX COARSE PITCH [id number] ST [id number] - SNA Metallic Hardware/[place] IMP SCR SYN MATRIX COARSE PITCH [id number] ST [id number] NA SYNTHES-STRATEC  N/A 1 Implanted   IMP SCR SYN MATRIX COARSE PITCH [id number] ST [id number] - SNA Metallic Hardware/[place] IMP SCR SYN MATRIX COARSE PITCH [id number] ST [id number] NA SYNTHES-STRATEC  N/A 3 Implanted        Specimen Removed:   None     Complications: none     Indications for Surgery:   John Monteiro is a 28 year old male referred for orthognathic surgery due  to his class 3 dentoskeletal deformity. Preoperatively he reported that he is unable to bite into foods and has to use his posterior teeth to incise into foods. The obstructive sleep apnea assessment was done at the initial consult. He admits to snoring and has been told that he stopped breathing at night. He reports excessive daytim sleepiness but denies frequent morning headaches or nightmares. Virtual surgical planning was performed, and lefort 1 osteotomy with BSSO were recommended to the patient.  The risks, benefits, alternatives including but not limited to no treatment, bleeding, bruising, swelling, infection, permanent or temporary numbness/tingling, damage to adjacent structures, need for additional procedures were discussed with the patient preoperatively.       Procedure description:   On the day of surgery, the patient was seen by myself and Dr. Reyes  in the pre-op holding area.  The procedure, benefits, risks, alternatives including no treatment were discussed again with the patient and an informed written consent was obtained for the planned procedure.  Patient was transported to the operating room and transferred to the OR table in a supine position.  Airway was secured via nasal tube by the anesthesia team. A throat pack was placed and the mouth was prepped with chlorhexidine. 10ml 0.25% marcaine w/ epi was delivered as maxillary local infiltration and bilateral mandibular inferior alveolar nerve blocks. The patient was prepped and draped in a sterile fashion for the planned procedure. .  A surgical timeout was performed by all members of the team.     Attention turned to Maxilla  Bovie electrocautery used to make maxillary vestibular incision 5mm superior to the mucogingival junction from the right maxillary first molar to the left maxillary first molar. #9 mucoperiosteal elevator then used to dissect subperiosteally to expose the bilateral piriform apertures, infra-orbital foramina and nerves,  and  zygomaticomaxillary buttresses. #9 mucoperiosteal elevator used to extend flap in subperiosteal plan to pterygomaxillary junction bilaterally. The nasal periosteum was then dissected from the lateral nasal wall and floor of the nose with a dc and freyer elevators. The septomaxillary ligament was then released from the anterior nasal spine, allowing complete visualization of the bony nasal floor and piriform rims. The custom cut guide was then placed and secured with 6mm screws x4. Surgical handpiece and drill used to pre-drill holes for custom plate. An osteotomy was then performed bilaterally with copious irrigation and a Piezo in the confines of the custom guide from the pterygomaxillary junction to the lateral nasal wall, with a Hannahan being used to protect the lateral nasal mucosa. The custom guide and stabilization screws were then removed. A nasal septal osteotome was then used to free the nasal cartilage and vomer from the maxilla and the nasal walls were seperated with an osteotome. The maxillary and pterygoid plates were then  with a curved osteotome directed medially and anteriorly at the most inferior portion of the pterygomaxillary junction. The maxilla was then down fractured with digital manipulation. Bony interferences were then reduced with a rongeur and a egg bur to ensure no premature bony contacts were present. A tear in the nasal mucosa was observed and was repaired with 3-0 chromic gut suture in continuous fashion.      The custom plate was then placed and secured with 6mm screws. Bony interferences were removed with rongeur. There was a fracture of the piriform and lateral sinus wall on the right, preventing placement of two of the superior segment screws. A 7 hole 0.7mm straight plate was then bent to the right zygomaticomaxillary buttress. The plate was secured to the superior segment with 6mm self drilling screws x2 and inferior segment with 6mm self drilling screw x1.   The procedural site was then copiously irrigated with normal saline. Surgiflo was then placed into the region of the pterygomaxillary junction and the maxillary sinuses bilaterally.        Bilateral BSSO  Attention was turned to the right-side of the mandibular arch.  Bovie electrocautery was used to create an incision over the external oblique ridge extending anteriorly to the mandibular 1st molar and posteriorly to the level of the maxillary occlusal plane.  Subperiosteal dissection was then performed to expose the external oblique ridge and medial aspect of the mandible until the mandibular lingula was encountered.  The temporalis muscle attachment was released from the coronoid process using a V notch retractor and monopolar cautery.  The inferior alveolar nerve was identified and protected using a retractor at the level of the lingula using a seldon retractor.     A piezo with copious irrigation was used for the medial ramus cut above the lingula continuing in the sagittal plane to the junction of the first and second molars. The sonapet was then used for the lateral vertical cut and inferior border cuts and continued to the sagittal cut. The osteotomy was then propagated to complete the sagittal split of the mandible using a series of osteotomes and a Smith .  Once , the neurovascular bundle of the inferior alveolar nerve was freed from the proximal segment. A rasp was then utilized to reduce any sharp areas of bone on proximal and distal segments. The fissure bur under copious irrigation was then used to remove approximately 3mm of bone on the distal aspect of the proximal segment.     Attention was turned to the left-side of the mandibular arch.  Bovie electrocautery was used to create an incision over the external oblique ridge extending anteriorly to the mandibular 1st molar and posteriorly to the level of the maxillary occlusal plane.  Subperiosteal dissection was then performed to expose  the external oblique ridge and medial aspect of the mandible until the mandibular lingula was encountered.  The temporalis muscle attachment was released from the coronoid process using a V notch retractor and dissection with mucoperiosteal elevator.  The inferior alveolar nerve was identified and protected using a retractor at the level of the lingula.      A sonapet with copious irrigation was used for the medial ramus cut above the lingula continuing in the sagittal plane to the junction of the first and second molars. The sonapet was then used for the lateral vertical cut and inferior border cuts and continued to the sagittal cut. The osteotomy was then propagated to complete the sagittal split of the mandible using a series of osteotomes and a Smith .  Once , the neurovascular bundle of the inferior alveolar nerve was noted to be in the distal segment. A rasp was then utilized to reduce any sharp areas of bone on proximal and distal segments. The egg bur under copious irrigation was then used to remove approximately 3mm of bone on the distal aspect of the proximal segment as well as removed posterior interferences between the segments..     The patient was then placed into maxillomandibular fixation with power chain. Interferences were removed using a surgical handpiece and egg shaped bur.  The condyles were seated in the glenoid fossae bilaterally and the inferior border of the mandible of both proximal and distal segments aligned.  A 15-blade was then used for a small stab incision through the skin of the cheeks bilaterally and blunt dissection was performed into the oral cavity using a hemostat.  The trocar system was then introduced to allow for drilling of holes for positional screws.  Holes were predrilled through the trocar access and the bony segments were secured with bicortical positional screws along the superior border of the mandible bilaterally with three screws on the right and  three screws on the left.       The patient was then released from intermaxillary fixation.  Occlusion was verified to be stable and reproducible.     Alar cynch was performed with 2-0 PDS suture.  Vivigen was placed at the bilateral zygomaticomaxillary interfaces.   The vestibular incision was closed in V-Y fashion using a skin hook at the midline and interrupted 3-0 chromic gut sutures to perform a 5mm tissue advancement. The remainder of the vestibular incision was closed with 3-0 chromic gut in continuous fashion    The mandibular incisions were closed using 3-0 chromic gut sutures in continuous running fashion. 5-0 fast absorbing gut use for skin closure of bilateral cheek trochar incisions.     At the conclusion of the procedure, the throat pack was removed and the stomach suctioned. Care was given back to the anesthesia. The patient was extubated and transferred stable to PACU.    I was told by the OR nurse staff that all needles, sponges, instruments were found to be correct and there were no intraoperative complications noted.     Attending staff was present for the entire duration of the procedure.     José Miguel Burger DDS  Oral & Maxillofacial [facility] PGY4

## 2023-11-29 NOTE — ANESTHESIA POSTPROCEDURE EVALUATION
Patient: John Monteiro    Procedure: Procedure(s):  OSTEOTOMY, LE FORT I, WITH MANDIBULAR SAGITTAL SPLIT bilateral       Anesthesia Type:  General    Note:  Disposition: Admission; Inpatient   Postop Pain Control: Uneventful            Sign Out: Well controlled pain   PONV: No   Neuro/Psych: Uneventful            Sign Out: Acceptable/Baseline neuro status   Airway/Respiratory: Uneventful            Sign Out: Acceptable/Baseline resp. status   CV/Hemodynamics: Uneventful            Sign Out: Acceptable CV status; No obvious hypovolemia; No obvious fluid overload   Other NRE: NONE   DID A NON-ROUTINE EVENT OCCUR? No           Last vitals:  Vitals Value Taken Time   /92 11/29/23 1400   Temp 38  C (100.4  F) 11/29/23 1325   Pulse 75 11/29/23 1405   Resp 19 11/29/23 1400   SpO2 95 % 11/29/23 1405   Vitals shown include unfiled device data.    Electronically Signed By: Evens Coronel MD  November 29, 2023  2:06 PM

## 2023-11-29 NOTE — ANESTHESIA PROCEDURE NOTES
Airway         Procedure Start/Stop Times: 11/29/2023 7:51 AM  Staff -        Anesthesiologist:  Nery Hendrix MD       Resident/Fellow: Luiz Shaw MD       Performed By: residentIndications and Patient Condition       Indications for airway management: diane-procedural         Mask difficulty assessment: 1 - vent by mask    Final Airway Details       Final airway type: endotracheal airway       Successful airway: Nasal and FLORY  Endotracheal Airway Details        ETT size (mm): 7.5       Cuffed: yes       Successful intubation technique: video laryngoscopy       VL Blade Size: MAC 4       Grade View of Cords: 1       Position: Center       Measured from: nares    Post intubation assessment        Placement verified by: capnometry, equal breath sounds and chest rise        Number of attempts at approach: 1       Number of other approaches attempted: 0       Secured with: sutures       Ease of procedure: easy       Dentition: Intact    Medication(s) Administered   Medication Administration Time: 11/29/2023 7:51 AM    Additional Comments       Both nostrils prepared with topical afrin. Progressive dilation of right sided nostril with nasal trumpets from small to larger diameter. 7.5 Nasal Flory advanced through right nostril, no  instrumentation with McGuill forceps required. Glidescope MAC 4.

## 2023-11-29 NOTE — ANESTHESIA POSTPROCEDURE EVALUATION
Patient: John Monteiro    Procedure: Procedure(s):  OSTEOTOMY, LE FORT I, WITH MANDIBULAR SAGITTAL SPLIT bilateral       Anesthesia Type:  General    Note:  Disposition: Inpatient   Postop Pain Control: Uneventful            Sign Out: Well controlled pain   PONV: No   Neuro/Psych: Uneventful            Sign Out: Acceptable/Baseline neuro status   Airway/Respiratory: Uneventful            Sign Out: Acceptable/Baseline resp. status   CV/Hemodynamics: Uneventful            Sign Out: Acceptable CV status; No obvious hypovolemia; No obvious fluid overload   Other NRE: NONE   DID A NON-ROUTINE EVENT OCCUR? No           Last vitals:  Vitals Value Taken Time   /86 11/29/23 1430   Temp 38  C (100.4  F) 11/29/23 1430   Pulse 79 11/29/23 1444   Resp 14 11/29/23 1430   SpO2 97 % 11/29/23 1444   Vitals shown include unfiled device data.    Electronically Signed By: Nery Hendrix MD  November 29, 2023  3:34 PM

## 2023-11-29 NOTE — DISCHARGE SUMMARY
[facility]    Oral & Maxillofacial Surgery - Discharge Summary    Date of Admission:  11/29/2023  Date of Discharge:  12/01/2023  Discharging Attending Provider: Dr. 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  (primary encounter diagnosis)    Plan: amoxicillin-clavulanate (AUGMENTIN) 400-57         MG/5ML suspension, pseudoePHEDrine (SUDAFED) 60        MG tablet, sodium chloride (OCEAN) 0.65 % nasal        spray, oxymetazoline (AFRIN) 0.05 % nasal         spray, hydrocortisone (CORTAID) 1 % external         cream, benzocaine-menthol (CEPACOL) 15-3.6 MG         lozenge, oxyCODONE (ROXICODONE) 5 MG/5ML         solution, acetaminophen (TYLENOL) 160 MG/5ML         solution, ibuprofen (ADVIL/MOTRIN) 100 MG/5ML         suspension, gabapentin (NEURONTIN) 250 MG/5ML         solution, sennosides (SENOKOT) 8.8 MG/5ML         syrup, ondansetron (ZOFRAN ODT) 4 MG ODT tab,         chlorhexidine (PERIDEX) 0.12 % solution      Follow-ups Needed After Discharge   Follow up as outpatient at the OM clinic next Tuesday 12/5/2023 at 2pm. Our clinic staff will call to confirm.     Oral and Maxillofacial Surgery (OMFS) Clinic  [facility]  [facility] - 7th floor  [address] [place] [address]  Clinic phone number: [phone]  Clinic fax number: [phone]     Hospital Course   John Monteiro was admitted on 11/29/2023 for a one piece [place] I and BSSO surgery.  The following problems were addressed during his hospitalization:    #mandibular hypoplasia     The surgery went well with no complications. Surgical plates and screws were placed to stabilize the maxilla and mandible in the new position. An occlusal splint was placed to guide the lower dentition to the new position. Patient was admitted overnight for observation after the surgery. The following day,  patient was able to ambulate to chair and hallway, voiding spontaneously. Pain was well controlled. Patient was able to take oral meds and tolerate fluids well. The hospital course was with no complications. Patient was discharged home on 12/01/2023 with post op instructions and prescriptions sent to [place] discharge pharmacy for .      Consultations This Hospital Stay   INTERNAL MEDICINE ADULT IP CONSULT FOR [place]  INTERNAL MEDICINE ADULT IP CONSULT FOR [place]    Code Status   Full Code       The patient was discussed with chief resident Dr. José Miguel Burger and attending Dr. Betty Marcos DDS  Oral & Maxillofacial Surgery, intern   Pager: [phone]   ______________________________________________________________________    Physical Exam   Vital Signs: Temp: 98.1  F (36.7  C) Temp src: Axillary BP: (!) 151/92 Pulse: 59   Resp: 16 SpO2: 100 % O2 Device: None (Room air)    Weight: 201 lbs 11.53 oz    GEN: WD/WN male, NAD  HEENT: NC/AT, EOMI, PERRL, facial edema consistent with postoperative edema, soft and warm with no induration, some small bruising around lips consistent with postoperative ecchymosis, no active purulence or drainage.   I/O: intraoral incision is well approximated with presence of chromic gut sutures, Maxilla is well perfused, occlusal splint is in place, patient can bring mandibular dentition close to the splint, braces are in place, vestibular edema consistent with postoperative edema no active purulence or drainage, CAROLINA 20 mm, midline is on   CV: RRR, no M/G/R  PULM: CTAB, breathing comfortably on room air  GI: Soft, ND/NT  MSK: MARIE, no peripheral extremity edema  NEURO: AAOx4, CN II-XII intact bilaterally except bilateral V3 hypoesthesia  PSYCH: Appropriate mood and affect     Significant Results and Procedures   No significant lab results      Pending Results     Unresulted Labs Ordered in the Past 30 Days of this Admission       Date and Time Order Name  Status Description    11/30/2023  6:29 AM Aldosterone Renin Ratio In process     11/30/2023  6:29 AM Renin activity In process     11/30/2023  6:29 AM Aldosterone In process                Primary Care Physician   CORBY Sauceda    Discharge Disposition   Discharged to home  Condition at discharge: Stable    Discharge Orders      Reason for your hospital stay    You stayed in the hospital on 11/29/2023 for a [place] I BSSO surgery     Activity    Your activity upon discharge:  Light activities for at least 6 weeks following the surgeries. Avoid contact sports, avoid heavy lifting. Avoid activities or going to sport events that could increase risks of facial trauma (balls/sport instruments hitting to face).     Discharge Instructions    HOME CARE INSTRUCTIONS FOLLOWING ORTHOGNATHIC SURGERY    WOUNDS  The wounds in your mouth and skin should be left alone and undisturbed for the first 24 hours after surgery. Do not rinse your mouth or use a mouthwash for 3 days following surgery. Avoid probing the wound with anything and do not use a water-pick during your healing course. Trauma to incisions can result loosening of sutures and opening of wounds. If you smoke please refrain for as long as possible, up to a week (or forever) is beneficial to healing.    NAUSEA AND VOMITING   You may experience some nausea or vomiting after surgery.  It is important to realize that this is not a life threatening situation since your stomach is empty. Take medications for nausea that you were given upon discharge as needed to help with the nausea.    SWELLING  Swelling occurs after all surgeries. The degree of swelling is quite variable in different individuals.  More swelling usually occurs with the lower jaw surgery.  Swelling will continue to increase for approximately 48 to 72 hours following surgery, and then will resolve within 10 days to 2 weeks. We try to minimize your swelling with a medication but some swelling should be  anticipated.  Also, ice packs may be applied to your face. You can reduce swelling by keeping your head elevated for the first week following surgery and by walking as soon as possible after surgery. The ointment should be used to keep your lips moist.    FOLLOWING SURGERY  It is common to experience minor bleeding both from the nose and mouth following surgery.  This generally stops at 24 to 48 hours but may continue for 7-10 days after surgery. Both your upper and lower lips will be numb from the surgery and may stay that way for a few months. Most patients recover all sensation to these areas but about 10-20% of patients will have some permanent lip numbness following surgery.     NASAL STUFFINESS AND SORE THROAT  Nasal stuffiness and a sore throat can occur, both from the tubes placed during surgery and from surgery on the upper jaw.  When stuffiness occurs, it can be managed with a combination of cleansing of the nostrils and nasal sprays.  Nasal secretions can be removed by using cotton-tipped swabs soaked in a solution of hydrogen peroxide and water (one to three parts).    When it is necessary to use a decongestant nasal spray, squeeze the spray bottle with sufficient force for you to taste the medication.  This will provide relief in approximately 3 to 5 minutes, but nasal decongestant sprays should not be used for more than 4 days.  The nasal stuffiness will resolve within approximately 1-2 weeks following surgery.  Your sore throat should improve by the third or fourth day following surgery. If your throat is still bothering you more than a week after surgery please make us aware of this.     CLEAR LIQUIDS  It will be important that you drink a sufficient volume of fluids to allow the intravenous fluids to be discontinued as soon as possible after surgery.  An average adult requires 2 to 3 quarts of fluid every 24 hours.  While this may seem like a large quantity, it can be best achieved with constant  "sipping.  Most patients drink directly from a cup or glass using a straw.  However, for those who find this difficult, use the large catheter tipped syringe to assist you in taking fluids.    ELASTICS AFTER SURGERY  In select cases the jaws and teeth do not require elastics after surgery.  This is determined by the specific nature of your skeletal condition and will be decided by your surgeon.  When the teeth have elastics holding the jaws together, these will be in place for 1 to 3 weeks.  In rare instances the jaws will have to be in elastics for a longer period of time. Change the elastics when they are broken. Notify us if you run out of the extra elastics before your next follow up visit with us at our clinic.     WALKING  You are encouraged to begin to walk as soon as possible.    OCCLUSAL SPLINTS  In many cases a clear plastic splint is placed between your teeth at surgery.  A splint is a plastic (acrylic) device constructed from your dental models that have been placed into the new bite (occlusal relationship).  After the jaw surgery, the teeth are often wired together into the \"splint\" to establish and maintain the correct jaw position.  This will remain in place until the wires are removed, and in most instances, will be used for a time following the release of fixation, typically 3 weeks. Notify us if there are any signs of the occlusal splints moving or mobility of the occlusal splints.     SPEECH  The ease with which you can communicate and be understood is not predictable immediately after surgery.  Your speech will improve, however, by repeated attempts on your part to talk and be understood.  It is important that you slow your rate of speech, concentrate on each word, and be willing to try.  Most patients can be understood within 24 hours after surgery but speech may take time to adapt to new jaw position.    CLEANING THE TEETH WHEN THE JAWS ARE \"WIRED\" TOGETHER  You will be encouraged to brush your " "teeth following each meal.  A child-sized soft toothbrush can be utilized for this purpose, paying particular attention to keeping the brush in direct contact with the teeth.  In addition to brushing, a mouth rinse may be used.  The rinse is made by diluting hydrogen peroxide, 50:50, with any commercial mouth rinse.  Do not use a water irrigating device, such as \"water pic,\" until after 2 weeks following surgery.  These irrigating devices have sufficient force to tear open the sutures in your mouth.  A \"water pic,\" along with a toothbrush will, however, provide an excellent means of maintaining oral hygiene approximately 2 weeks following surgery.    POSTOPERATIVE PAIN  Pain must be anticipated.  In most instances, however, it is moderate and treated with medications. Every patient experiences pain differently and there is no one size fits all approach. Your surgical team will attempt to tailor your medications to best control your pain. When bone grafts are taken from the hip (or rib or skull) more discomfort should be anticipated.  Generally, no injections are needed for pain and a liquid medication is all that is required.    MEDICATIONS  You will usually be given antibiotics, nasal decongestants, nasal spray, ointment to keep your lips moist, and a pain medication if needed.  Most often these will be discontinued on discharge from the hospital, except for some of the medications, which will be used for 3 days to 2 weeks following surgery. Antibiotics are typically not prescribed after your hospital stay as you have getting them through the IV. An additional course of oral antibiotics may be prescribed but only if deemed necessary by the surgical team. The typical regiment of medications include pain medication, stool softener, nasal decongestants, mucolytics and anti-nausea medications. If you feel that you need additional medications please inform your surgical team. Below is a list of medications and the " "common usages    Common medications:  Hydrocodone/Oxycodone - Narcotic pain control  Acetominophen/Ibuprofen - Non-narcotic pain control  Amoxicillin/Clindamycin - Antibiotic, typically a 5-7 day course  Ondansetron/Phenergan/Compazine/Scopolamine - Anti-nausea medication  Guaufenesin - Mucolytic, used to keep mucous from hardening in nasal passages after upper jaw surgery  Sudafed/Claritin - Decongestant, used to keep sinuses clear after upper jaw surgery  Colace/Senna - Stool softener used while taking Narcotic pain medication      DIET  Strict full liquid, no chew diet for at least 6 weeks following the surgery. During the period of hospitalization we may ask a dietician will discuss \"at-home\" dietary management with you and members of your family.  This will include a diet booklet.  It is suggested that prior to your surgery, you acquire a  and a food strainer.  Commercial dietary supplements such as Ensure   may also be of assistance during the postoperative period; the dietician can suggest those that are recommended.  These can be purchased, without a prescription, in your local pharmacy.  Most have a variety of flavors.     WEIGHT LOSS  Again, a weight loss of 10 to 20 pounds may be anticipated during the postoperative period.  This is usually a reflection of a loss of appetite, rather than the fact that the teeth are \"wired\" together.  Within 2 weeks following surgery, your appetite should be sufficiently improved to maintain and possibly increase your weight.    ACTIVITIES  Light activities for at least 6 weeks following the surgeries. Avoid contact sports, avoid heavy lifting. Avoid activities or going to sport events that could increase risks of facial trauma (balls/sport instruments hitting to face).    MUSICAL ACTIVITIES   Avoid using blowing musical instruments for at least 6 weeks following surgery.     SINUS PRECAUTIONS  If you had LeFort surgery as part of your orthognathic surgery, apply " strict sinus precautions for at least 6 weeks following surgery:     AVOID   - blowing your nose: It is best to wipe away nasal secretions carefully. After 2 weeks, if you must blow your nose, blow gently through both sides at the same time. Do not pinch your nose; do not blow just one side at a time.   - sneezing: If you must sneeze, keep your mouth open and do not pinch your nose closed.   - sucking: Do not drink through a straw. Do not smoke.   - blowing: Do not play a wind instrument. Do not blow up balloons.   - pushing or lifting: Do not lift or push objects weighing more than 10 pounds.   - bending over: Keep your head above the level of your heart. Sleep with your head slightly raised.     DAY OF DISCHARGE  Most patients are ready for discharge 1 to 2 days after surgery.  You will be encouraged to resume your normal activities as soon as possible. Your discharge from the hospital will be dictated by four things. One, your ability to walk independently. Two, your ability to maintain adequate fluid intake to prevent dehydration. Three, your ability and urinate without difficulty and four, that your pain is controlled with medications taken by mouth. After your discharge you will be provided the contact information of the on-call Oral & Maxillofacial Surgery resident (24/7). This person is available for questions should any arise after discharge but please read through this entire document prior to calling. The answer may be in the document.     AFTER HOURS CONTACT FOR EMERGENCIES:  Please call the [facility] switchboard at [phone] and ask to have the Oral and Maxillofacial Surgery Resident On-call paged (24/7).    It is our desire to make your recovery as smooth as possible. These instructions are given to assist you following your surgery. If you have any questions please call the clinic at [phone] (during clinic hours).     Follow Up ([facility])    Follow up as outpatient at the [facility]  "next Tuesday 12/5/2023 at 2pm. Our clinic staff will call to confirm     Oral and Maxillofacial Surgery (OMFS) Clinic  [facility]  [facility] - 7th floor  [address] [place] [address]  Clinic phone number: [phone]  Clinic fax number: [phone]     Diet    Follow this diet upon discharge:  Strict full liquid, no chew diet for at least 6 weeks following the surgery. During the period of hospitalization we may ask a dietician will discuss \"at-home\" dietary management with you and members of your family.  This will include a diet booklet.  It is suggested that prior to your surgery, you acquire a  and a food strainer.     Discharge Medications   Current Discharge Medication List        START taking these medications    Details   acetaminophen (TYLENOL) 160 MG/5ML solution Take 20 mLs (640 mg) by mouth every 6 hours  Qty: 473 mL, Refills: 0    Associated Diagnoses: Maxillary hypoplasia      amoxicillin-clavulanate (AUGMENTIN) 400-57 MG/5ML suspension Take 10.94 mLs (875 mg) by mouth 2 times daily for 7 days  Qty: 153.16 mL, Refills: 0    Associated Diagnoses: Maxillary hypoplasia      benzocaine-menthol (CEPACOL) 15-3.6 MG lozenge Place 1 lozenge inside cheek every hour as needed for sore throat  Qty: 12 lozenge, Refills: 0    Associated Diagnoses: Maxillary hypoplasia      chlorhexidine (PERIDEX) 0.12 % solution Swish and spit 15 mLs in mouth 2 times daily  Qty: 473 mL, Refills: 0    Associated Diagnoses: Maxillary hypoplasia      gabapentin (NEURONTIN) 250 MG/5ML solution Take 2 mLs (100 mg) by mouth 3 times daily for 30 days  Qty: 180 mL, Refills: 0    Associated Diagnoses: Maxillary hypoplasia      hydrocortisone (CORTAID) 1 % external cream Apply topically every 6 hours as needed for other (cream apply to lips)  Qty: 14.2 g, Refills: 0    Associated Diagnoses: Maxillary hypoplasia      ibuprofen (ADVIL/MOTRIN) 100 MG/5ML suspension Take 30 mLs (600 mg) by " mouth every 6 hours  Qty: 473 mL, Refills: 0    Associated Diagnoses: Maxillary hypoplasia      ondansetron (ZOFRAN ODT) 4 MG ODT tab Take 1 tablet (4 mg) by mouth every 8 hours as needed for nausea  Qty: 12 tablet, Refills: 0    Associated Diagnoses: Maxillary hypoplasia      oxyCODONE (ROXICODONE) 5 MG/5ML solution Take 10 mLs (10 mg) by mouth every 6 hours as needed for pain  Qty: 120 mL, Refills: 0    Associated Diagnoses: Maxillary hypoplasia      oxymetazoline (AFRIN) 0.05 % nasal spray Spray 2 sprays into both nostrils 2 times daily for 3 days  Qty: 1.2 mL, Refills: 0    Associated Diagnoses: Maxillary hypoplasia      pseudoePHEDrine (SUDAFED) 60 MG tablet Take 1 tablet (60 mg) by mouth every 6 hours for 3 days  Qty: 12 tablet, Refills: 0    Associated Diagnoses: Maxillary hypoplasia      sennosides (SENOKOT) 8.8 MG/5ML syrup Take 5 mLs by mouth at bedtime for 7 days  Qty: 35 mL, Refills: 0    Associated Diagnoses: Maxillary hypoplasia      sodium chloride (OCEAN) 0.65 % nasal spray 2 sprays each nare every hour as needed  Qty: 88 mL, Refills: 0    Comments: 2 sprays each nare every hour as needed  Associated Diagnoses: Maxillary hypoplasia           CONTINUE these medications which have NOT CHANGED    Details   amphetamine-dextroamphetamine (ADDERALL XR) 10 MG 24 hr capsule Take 10 mg by mouth daily      cholecalciferol (VITAMIN D3) 25 mcg (1000 units) capsule Take 1,000 Units by mouth daily      finasteride (PROPECIA) 1 MG tablet Take 1 mg by mouth daily      ketoconazole (NIZORAL) 2 % external shampoo Apply topically three times a week      loratadine-pseudoePHEDrine (CLARITIN-D 12-HOUR) 5-120 MG 12 hr tablet Take 1 tablet by mouth 2 times daily           Allergies   Allergies   Allergen Reactions    Seasonal Allergies      Ragweed and trees

## 2023-11-29 NOTE — BRIEF OP NOTE
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  Complications: None.  Implants:   Implant Name Type Inv. Item Serial No.  Lot No. LRB No. Used Action   TRUMATCH ORTHOGNATHICS KIT FULL MAXILLARY - SNA Metallic Hardware/[place] TRUMATCH ORTHOGNATHICS KIT FULL MAXILLARY NA SYNTHES-STRATEC NA N/A 1 Implanted   IMP PLATE SYN MATRIX MIDFACE ADAPTER 20H 0.7MM [phone] - SNA Metallic Hardware/[place] IMP PLATE SYN MATRIX MIDFACE ADAPTER 20H 0.7MM [phone] NA SYNTHES-STRATEC NA Right 1 Implanted   IMP SCR SYN MATRIX 1.85X6MM SELF DRILL [phone] - SNA Metallic Hardware/[place] IMP SCR SYN MATRIX 1.85X6MM SELF DRILL [phone] NA SYNTHES-STRATEC NA N/A 17 Implanted   GRAFT BONE VIVIGEN   5ML [id number] - [id number] Bone/Tissue/Biologic GRAFT BONE VIVIGEN   5ML [id number] LIFENET  N/A 1 Implanted   IMP SCR SYN MATRIX COARSE PITCH [id number] ST [phone] - SNA Metallic Hardware/[place] IMP SCR SYN MATRIX COARSE PITCH [id number] ST [phone] NA SYNTHES-STRATEC  N/A 2 Implanted   IMP SCR SYN MATRIX COARSE PITCH [id number] ST [phone] - SNA Metallic Hardware/[place] IMP SCR SYN MATRIX COARSE PITCH [id number] ST [phone] NA SYNTHES-STRATEC  N/A 1 Implanted   IMP SCR SYN MATRIX COARSE PITCH [id number] ST [phone] - SNA Metallic Hardware/[place] IMP SCR SYN MATRIX COARSE PITCH [id number] ST [phone] NA [facility]  N/A 3  Implanted

## 2023-11-30 ENCOUNTER — APPOINTMENT (OUTPATIENT)
Dept: CARDIOLOGY | Facility: CLINIC | Age: 28
End: 2023-11-30
Attending: NURSE PRACTITIONER
Payer: COMMERCIAL

## 2023-11-30 LAB
ALBUMIN MFR UR ELPH: 10.6 MG/DL
ANION GAP SERPL CALCULATED.3IONS-SCNC: 16 MMOL/L (ref 7–15)
ATRIAL RATE - MUSE: 79 BPM
BUN SERPL-MCNC: 8.1 MG/DL (ref 6–20)
CALCIUM SERPL-MCNC: 8.8 MG/DL (ref 8.6–10)
CHLORIDE SERPL-SCNC: 104 MMOL/L (ref 98–107)
CREAT SERPL-MCNC: 0.87 MG/DL (ref 0.67–1.17)
CREAT UR-MCNC: 65.6 MG/DL
DEPRECATED HCO3 PLAS-SCNC: 21 MMOL/L (ref 22–29)
DIASTOLIC BLOOD PRESSURE - MUSE: NORMAL MMHG
EGFRCR SERPLBLD CKD-EPI 2021: >90 ML/MIN/1.73M2
ERYTHROCYTE [DISTWIDTH] IN BLOOD BY AUTOMATED COUNT: 12.5 % (ref 10–15)
GLUCOSE SERPL-MCNC: 174 MG/DL (ref 70–99)
HCT VFR BLD AUTO: 33.8 % (ref 40–53)
HGB BLD-MCNC: 11.5 G/DL (ref 13.3–17.7)
INTERPRETATION ECG - MUSE: NORMAL
LVEF ECHO: NORMAL
MCH RBC QN AUTO: 31.2 PG (ref 26.5–33)
MCHC RBC AUTO-ENTMCNC: 34 G/DL (ref 31.5–36.5)
MCV RBC AUTO: 92 FL (ref 78–100)
P AXIS - MUSE: 26 DEGREES
PLATELET # BLD AUTO: 212 10E3/UL (ref 150–450)
POTASSIUM SERPL-SCNC: 3.9 MMOL/L (ref 3.4–5.3)
PR INTERVAL - MUSE: 168 MS
PROT/CREAT 24H UR: 0.16 MG/MG CR (ref 0–0.2)
QRS DURATION - MUSE: 96 MS
QT - MUSE: 380 MS
QTC - MUSE: 435 MS
R AXIS - MUSE: 14 DEGREES
RBC # BLD AUTO: 3.69 10E6/UL (ref 4.4–5.9)
SODIUM SERPL-SCNC: 141 MMOL/L (ref 135–145)
SYSTOLIC BLOOD PRESSURE - MUSE: NORMAL MMHG
T AXIS - MUSE: 19 DEGREES
VENTRICULAR RATE- MUSE: 79 BPM
WBC # BLD AUTO: 12.2 10E3/UL (ref 4–11)

## 2023-11-30 PROCEDURE — 93306 TTE W/DOPPLER COMPLETE: CPT | Mod: 26 | Performed by: INTERNAL MEDICINE

## 2023-11-30 PROCEDURE — 84156 ASSAY OF PROTEIN URINE: CPT | Performed by: NURSE PRACTITIONER

## 2023-11-30 PROCEDURE — 85027 COMPLETE CBC AUTOMATED: CPT | Performed by: STUDENT IN AN ORGANIZED HEALTH CARE EDUCATION/TRAINING PROGRAM

## 2023-11-30 PROCEDURE — 36415 COLL VENOUS BLD VENIPUNCTURE: CPT | Performed by: STUDENT IN AN ORGANIZED HEALTH CARE EDUCATION/TRAINING PROGRAM

## 2023-11-30 PROCEDURE — 250N000013 HC RX MED GY IP 250 OP 250 PS 637

## 2023-11-30 PROCEDURE — 250N000011 HC RX IP 250 OP 636: Performed by: STUDENT IN AN ORGANIZED HEALTH CARE EDUCATION/TRAINING PROGRAM

## 2023-11-30 PROCEDURE — 99207 PR APP CREDIT; MD BILLING SHARED VISIT: CPT | Performed by: INTERNAL MEDICINE

## 2023-11-30 PROCEDURE — 250N000013 HC RX MED GY IP 250 OP 250 PS 637: Performed by: STUDENT IN AN ORGANIZED HEALTH CARE EDUCATION/TRAINING PROGRAM

## 2023-11-30 PROCEDURE — 255N000002 HC RX 255 OP 636: Performed by: INTERNAL MEDICINE

## 2023-11-30 PROCEDURE — 36415 COLL VENOUS BLD VENIPUNCTURE: CPT | Performed by: NURSE PRACTITIONER

## 2023-11-30 PROCEDURE — 82088 ASSAY OF ALDOSTERONE: CPT | Performed by: NURSE PRACTITIONER

## 2023-11-30 PROCEDURE — 93005 ELECTROCARDIOGRAM TRACING: CPT

## 2023-11-30 PROCEDURE — 93010 ELECTROCARDIOGRAM REPORT: CPT | Performed by: INTERNAL MEDICINE

## 2023-11-30 PROCEDURE — 999N000208 ECHOCARDIOGRAM COMPLETE

## 2023-11-30 PROCEDURE — 99222 1ST HOSP IP/OBS MODERATE 55: CPT | Performed by: NURSE PRACTITIONER

## 2023-11-30 PROCEDURE — 120N000002 HC R&B MED SURG/OB UMMC

## 2023-11-30 PROCEDURE — 84244 ASSAY OF RENIN: CPT | Performed by: NURSE PRACTITIONER

## 2023-11-30 PROCEDURE — 80048 BASIC METABOLIC PNL TOTAL CA: CPT | Performed by: STUDENT IN AN ORGANIZED HEALTH CARE EDUCATION/TRAINING PROGRAM

## 2023-11-30 RX ORDER — FAMOTIDINE 40 MG/5ML
20 POWDER, FOR SUSPENSION ORAL 2 TIMES DAILY PRN
Status: DISCONTINUED | OUTPATIENT
Start: 2023-11-30 | End: 2023-12-01 | Stop reason: HOSPADM

## 2023-11-30 RX ORDER — ACETAMINOPHEN 325 MG/10.15ML
650 LIQUID ORAL EVERY 4 HOURS
Status: DISCONTINUED | OUTPATIENT
Start: 2023-11-30 | End: 2023-12-01 | Stop reason: HOSPADM

## 2023-11-30 RX ADMIN — HYDROCORTISONE: 1 CREAM TOPICAL at 09:21

## 2023-11-30 RX ADMIN — GABAPENTIN 300 MG: 250 SOLUTION ORAL at 06:12

## 2023-11-30 RX ADMIN — IBUPROFEN 600 MG: 200 SUSPENSION ORAL at 04:18

## 2023-11-30 RX ADMIN — OXYCODONE HYDROCHLORIDE 10 MG: 5 SOLUTION ORAL at 07:59

## 2023-11-30 RX ADMIN — HUMAN ALBUMIN MICROSPHERES AND PERFLUTREN 6 ML: 10; .22 INJECTION, SOLUTION INTRAVENOUS at 10:04

## 2023-11-30 RX ADMIN — ACETAMINOPHEN 650 MG: 325 SOLUTION ORAL at 13:51

## 2023-11-30 RX ADMIN — AMPICILLIN SODIUM AND SULBACTAM SODIUM 3 G: 2; 1 INJECTION, POWDER, FOR SOLUTION INTRAMUSCULAR; INTRAVENOUS at 16:32

## 2023-11-30 RX ADMIN — DEXAMETHASONE SODIUM PHOSPHATE 8 MG: 4 INJECTION, SOLUTION INTRA-ARTICULAR; INTRALESIONAL; INTRAMUSCULAR; INTRAVENOUS; SOFT TISSUE at 08:00

## 2023-11-30 RX ADMIN — AMPICILLIN SODIUM AND SULBACTAM SODIUM 3 G: 2; 1 INJECTION, POWDER, FOR SOLUTION INTRAMUSCULAR; INTRAVENOUS at 10:44

## 2023-11-30 RX ADMIN — PSEUDOEPHEDRINE HYDROCHLORIDE 60 MG: 60 TABLET ORAL at 08:18

## 2023-11-30 RX ADMIN — IBUPROFEN 600 MG: 200 SUSPENSION ORAL at 10:44

## 2023-11-30 RX ADMIN — ACETAMINOPHEN 650 MG: 325 SOLUTION ORAL at 20:23

## 2023-11-30 RX ADMIN — HYDROMORPHONE HYDROCHLORIDE 0.4 MG: 0.2 INJECTION, SOLUTION INTRAMUSCULAR; INTRAVENOUS; SUBCUTANEOUS at 06:00

## 2023-11-30 RX ADMIN — HYDROMORPHONE HYDROCHLORIDE 0.4 MG: 0.2 INJECTION, SOLUTION INTRAMUSCULAR; INTRAVENOUS; SUBCUTANEOUS at 10:33

## 2023-11-30 RX ADMIN — AMPICILLIN SODIUM AND SULBACTAM SODIUM 3 G: 2; 1 INJECTION, POWDER, FOR SOLUTION INTRAMUSCULAR; INTRAVENOUS at 22:04

## 2023-11-30 RX ADMIN — GABAPENTIN 300 MG: 250 SOLUTION ORAL at 21:58

## 2023-11-30 RX ADMIN — HYDROMORPHONE HYDROCHLORIDE 0.4 MG: 0.2 INJECTION, SOLUTION INTRAMUSCULAR; INTRAVENOUS; SUBCUTANEOUS at 13:49

## 2023-11-30 RX ADMIN — GABAPENTIN 300 MG: 250 SOLUTION ORAL at 13:51

## 2023-11-30 RX ADMIN — OXYCODONE HYDROCHLORIDE 10 MG: 5 SOLUTION ORAL at 20:27

## 2023-11-30 RX ADMIN — HYDROMORPHONE HYDROCHLORIDE 0.4 MG: 0.2 INJECTION, SOLUTION INTRAMUSCULAR; INTRAVENOUS; SUBCUTANEOUS at 02:42

## 2023-11-30 RX ADMIN — CHLORHEXIDINE GLUCONATE 15 ML: 1.2 SOLUTION ORAL at 07:59

## 2023-11-30 RX ADMIN — MAGNESIUM HYDROXIDE 30 ML: 400 SUSPENSION ORAL at 16:31

## 2023-11-30 RX ADMIN — OXYCODONE HYDROCHLORIDE 10 MG: 5 SOLUTION ORAL at 04:24

## 2023-11-30 RX ADMIN — AMPICILLIN SODIUM AND SULBACTAM SODIUM 3 G: 2; 1 INJECTION, POWDER, FOR SOLUTION INTRAMUSCULAR; INTRAVENOUS at 04:23

## 2023-11-30 RX ADMIN — OXYCODONE HYDROCHLORIDE 10 MG: 5 SOLUTION ORAL at 12:14

## 2023-11-30 RX ADMIN — OXYCODONE HYDROCHLORIDE 10 MG: 5 SOLUTION ORAL at 16:31

## 2023-11-30 RX ADMIN — IBUPROFEN 600 MG: 200 SUSPENSION ORAL at 21:58

## 2023-11-30 RX ADMIN — ACETAMINOPHEN 650 MG: 325 SOLUTION ORAL at 10:33

## 2023-11-30 RX ADMIN — IBUPROFEN 600 MG: 200 SUSPENSION ORAL at 16:31

## 2023-11-30 RX ADMIN — ACETAMINOPHEN 650 MG: 325 SOLUTION ORAL at 06:57

## 2023-11-30 RX ADMIN — SENNOSIDES AND DOCUSATE SODIUM 1 TABLET: 8.6; 5 TABLET ORAL at 20:23

## 2023-11-30 RX ADMIN — CHLORHEXIDINE GLUCONATE 15 ML: 1.2 SOLUTION ORAL at 20:23

## 2023-11-30 RX ADMIN — OXYMETAZOLINE HYDROCHLORIDE 2 SPRAY: 0.05 SPRAY NASAL at 09:20

## 2023-11-30 ASSESSMENT — ACTIVITIES OF DAILY LIVING (ADL)
ADLS_ACUITY_SCORE: 28
ADLS_ACUITY_SCORE: 26

## 2023-11-30 NOTE — PROGRESS NOTES
ORAL & MAXILLOFACIAL SURGERY   PROGRESS NOTE  John Monteiro,  MRN: [id number],  : 1995      ASSESSMENT:  28 year old male s/p LeFort 1 osteotomy with a mandibular sagittal split bilateral on 23.  POD1    PLAN:  - Prioritize pain control with Scheduled and PRN medications.              -  Please page OMFS on-call if pain is not being adequately managed.  - Encourage PO intake with full liquid diet.   - Continue Unasyn.  - HOB elevated >30 degrees.   - Encourage ambulation.  - Ice compress to face PRN  - Vaseline at bed side  - suction at bedside   - Medicine was consulted to manage his HTN  - Possible discharge tomorrow 23 dependent on pain management.     Discussed with resident Dr. Matt Romero, DMD  Oral & Maxillofacial Surgery, Intern    Please contact the OMFS resident on-call with questions or concerns.  ____________________________________    SUBJECTIVE:  John Monteiro, 27 y/o male s/p LeFort 1 osteotomy with a mandibular sagittal split bilateral on 23. Acute events over night was been the  bp's running 160/. The patient has been endorsing moderate pain since surgery with pain around 5-6/10.  Patient was laying down and upright in hospital bed on exam.  He has ambulated to the bathroom and voided. He is tolerating clear liquids.      PHYSICAL EXAM:   Vitals:    23 0000 23 0100 23 0202 23 0611   BP: (!) 152/95 (!) 158/92 (!) 155/83 (!) 152/89   BP Location:    Left arm   Pulse:       Resp:       Temp:       TempSrc:       SpO2: 99% 100% 99%    Weight:       Height:            GEN: WD/WN male, NAD  NEURO: AAOx4, cranial nerves grossly intact with exception to bilateral infraorbital and mental nerve hypoesthesia  HEENT: head normocephalic, atraumatic, EOMI, PERRL, bilateral facial edema consistent with postoperative edema, soft and tender with no induration. Extraoral incisions are hemostatic with no dehiscence.   Oral: edema as expected,  midline is on, CAROLINA about 10 m, maxillary gingiva and palate is well perfused, intraoral incisions are well approximated with presence of chromic gut sutures, no persistent bleeding. The blood in the mouth is dry. maxillary Occlusal splint secured to maxillary dentition and stable  Skin: no rashes or lesions. Jaw bra with ice packs are in place.   CV: no JVD appreciated, RRR   PULM: b/l = chest rise, breathing easily   Abd: non distended, non tender, soft   Ext: warm, well-perfused  PSYCH: Appropriate mood and affect    LABS:   CBC RESULTS:   Recent Labs   Lab Test 11/30/23  0453   WBC 12.2*   RBC 3.69*   HGB 11.5*   HCT 33.8*   MCV 92   MCH 31.2   MCHC 34.0   RDW 12.5         Last Comprehensive Metabolic Panel:  Sodium   Date Value Ref Range Status   11/30/2023 141 135 - 145 mmol/L Final     Comment:     Reference intervals for this test were updated on 09/26/2023 to more accurately reflect our healthy population. There may be differences in the flagging of prior results with similar values performed with this method. Interpretation of those prior results can be made in the context of the updated reference intervals.    09/19/2009 140 133 - 143 mmol/L Final     Potassium   Date Value Ref Range Status   11/30/2023 3.9 3.4 - 5.3 mmol/L Final   09/19/2009 3.9 3.4 - 5.3 mmol/L Final     Chloride   Date Value Ref Range Status   11/30/2023 104 98 - 107 mmol/L Final   09/19/2009 106 98 - 110 mmol/L Final     Carbon Dioxide   Date Value Ref Range Status   09/19/2009 27 20 - 32 mmol/L Final     Carbon Dioxide (CO2)   Date Value Ref Range Status   11/30/2023 21 (L) 22 - 29 mmol/L Final     Anion Gap   Date Value Ref Range Status   11/30/2023 16 (H) 7 - 15 mmol/L Final   09/19/2009 7 6 - 17 mmol/L Final     Glucose   Date Value Ref Range Status   11/30/2023 174 (H) 70 - 99 mg/dL Final   12/27/2012 90 60 - 99 mg/dL Final     Comment:     Non Fasting     GLUCOSE BY METER POCT   Date Value Ref Range Status   11/29/2023 90  70 - 99 mg/dL Final     Urea Nitrogen   Date Value Ref Range Status   11/30/2023 8.1 6.0 - 20.0 mg/dL Final   09/19/2009 15 5 - 24 mg/dL Final     Creatinine   Date Value Ref Range Status   11/30/2023 0.87 0.67 - 1.17 mg/dL Final   09/19/2009 0.49 0.39 - 0.73 mg/dL Final     Comment:     New IDMS-traceable calibration  beginning 5/1/08     GFR Estimate   Date Value Ref Range Status   11/30/2023 >90 >60 mL/min/1.73m2 Final   09/19/2009 GFR not calculated, patient <16 years old. mL/min/1.7m2 Final     Calcium   Date Value Ref Range Status   11/30/2023 8.8 8.6 - 10.0 mg/dL Final   09/19/2009 9.8 8.7 - 10.8 mg/dL Final        RADIOLOGY:  No new imaging

## 2023-11-30 NOTE — PLAN OF CARE
Status: POD#1 Le Fort 1 osteotomy with a mandibular sagittal split bilateral on 11/29/23   Vitals: HTN. IV BP meds PRN for SBP<170 and medicine consulted. Sats on RA %.Cont. Pulse ox.  Neuros: Numbness to jaw/mouth area from surgery. Otherwise intact  IV: PIV SL  Resp/trach: Reported SOB. LSC  Diet: Full liquid diet. Tolerated liquids well, good PO.  Bowel status: LBM 11/30/23  : Voiding spontaneously in the BR  Skin: Bilateral cheek incisions, jaw bra on.   Pain: Partially managed with IV dilaudid, PO oxycodone, scheduled tylenol/ibuprofen, and ice packs  Activity: SBA  Social: Brother stayed overnight last night. Family supportive and participating in cares.  Plan: Pain management. Medicine following. ECHO done. Continue to monitor and follow POC

## 2023-11-30 NOTE — PROGRESS NOTES
Brief OMFS Note    The patient continues to feel anxious about his sore and swollen feeling throat. Assured the patient that he is saturating in the high 90s on room air. Still has some concern about pain control, was using IV dilaudid this afternoon. Reviewed the multimodal pain control plan, including asking for PRN medication. Discussed that oxycodone is longer lasting than dilaudid and will provide more constant relief. The patient will stay another night to optimize pain control. IV dilaudid discontinued so the patient may trial PO medications only before going home. Continuous fluids stopped. Otherwise, the patient is ambulating, taking good PO, and voiding urine.    Per the Hospitalist Gold Team, the patient may follow up outpatient for the pending results of his HTN workup. He has a follow-up visit scheduled next week.    Likely discharge tomorrow morning.    Jojo Gutierrez DDS  OMFS PGY-3

## 2023-11-30 NOTE — PLAN OF CARE
Status: POD#1 Le Fort 1 osteotomy with a mandibular sagittal split bilateral on 11/29/23   Vitals: HTN. IV BP meds given and medicine consulted. on 2L NC. Cont. Pulse ox.  Neuros: Numbness to jaw/mouth area from surgery. Otherwise intact  IV: PIV SL  Resp/trach: Reported SOB. LSC  Diet: Full liquid diet. Tolerated clear liquids well, good PO.  Bowel status: LBM PTA  : Voiding spontaneously in the BR  Skin: Bilateral cheek incisions, jaw bra on.   Pain: Partially managed with IV dilaudid, PO oxycodone, scheduled tylenol/ibuprofen, and ice packs  Activity: SBA  Social: Brother stayed overnight. Supportive and participating in cares.  Plan: Pain management. Medicine following. ECHO to be done. Continue to monitor and follow POC  Updates this shift: EKG done.

## 2023-11-30 NOTE — ANESTHESIA CARE TRANSFER NOTE
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    Anesthesia Type:   General     Note:    Oropharynx: oropharynx clear of all foreign objects and spontaneously breathing  Level of Consciousness: awake and drowsy  Oxygen Supplementation: face mask  Level of Supplemental Oxygen (L/min / FiO2): 6  Independent Airway: airway patency satisfactory and stable  Dentition: S/P dental procedure  Vital Signs Stable: post-procedure vital signs reviewed and stable  Report to RN Given: handoff report given  Patient transferred to: PACU    Handoff Report: Identifed the Patient, Identified the Reponsible Provider, Reviewed the pertinent medical history, Discussed the surgical course, Reviewed Intra-OP anesthesia mangement and issues during anesthesia, Set expectations for post-procedure period and Allowed opportunity for questions and acknowledgement of understanding      Vitals:  Vitals Value Taken Time   /86 11/29/23 1430   Temp 38  C (100.4  F) 11/29/23 1430   Pulse 79 11/29/23 1444   Resp 14 11/29/23 1430   SpO2 97 % 11/29/23 1444   Vitals shown include unfiled device data.    Electronically Signed By: Luiz Shaw MD  November 30, 2023  6:59 AM

## 2023-11-30 NOTE — CONSULTS
"[facility]  Consult Note - Hospitalist Service, GOLD TEAM   Date of Admission:  11/29/2023  Consult Requested by: Dr. Romero  Reason for Consult: Management of elevated blood pressures  Per consult request \" Elevated bood pressures 150-160/90 and does not take any meds at home\"      Assessment & Plan   John Monteiro is a 28 year old male admitted on 11/29/2023. 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 Other past medical history include HTN, cardiac murmur and hair loss and ADHD.      # Hypertension  Patient states that he has had elevated blood pressures for the last 2 months due to a new sedentary job and poor diet. He was due to follow up at his PCP but has not made another appointment as yet. He checks his blood pressures at home with a family member's sphygmomanometer. He has a known cardiac mumur with last echo in 2014. He has been having increased shortness of breath over the last few months as well.  Med review shows that he is on finasteride for hair loss, this can lower blood pressures, but he has not been consistent with taking it.  - EKG  - Echocardiogram  - BMP  - plasma renin activity  - UA protein  - renin aldosterone ratio  - If above are normal, start amlodipine 5 mg and hold PTA finasteride until outpatient follow-up  - OK to follow primary team recs with PRNs (hydralazine and labetolol).  - Optimize pain management   - Patient teaching on lifestyle modification for hypertension    #  S/P Lefort 1 osteotomy 11/29/23  Management per primary team    # ADHD  Held his adderal over the last few weeks due to hypertension       The patient's care was discussed with the Attending Physician, Dr. Strickland .       DIAMANTE Rodrigues CNP  Hospitalist Service, GOLD TEAM   Securely message with [employer] (more info)  Text page via [employer] Paging/Directory   See signed in provider for up to date " coverage information  ______________________________________________________________________    Chief Complaint   S/p Leforte 1    History is obtained from the patient    History of Present Illness   John Monteiro is a 28 year old male who has a  past medical history of HTN, cardiac murmur, hair loss and ADHD. Medicine consulted for management of hypertension. Patient states that he is aware that his blood pressures are high and it was mentioned to him at clinic appointments. His plan was to continue follow-up at PCP. He was noted to be hypertensive again post-op and received hydralazine and labetalol.   He has a known systolic mumur and was followed at [facility] pediatric clinic had an EKG in 2009 and an echocardiogram in 2014. He in concerned that he is more short of breath in the last few months and can only walk a few blocks before getting short of breath. This could be, however, from current sedentary state and dietary habits.      Past Medical History    Past Medical History:   Diagnosis Date    Abdominal pain 12/18/2012    ADHD     Ankle sprain 2019    Per patient report- R ankle roll when playing basketball in Spring 2019    Appendicitis 09/19/2009    Distal radius fracture 10/23/2021    Elbow fracture 07/25/2018    Pneumonia 04/16/2009       Past Surgical History   Past Surgical History:   Procedure Laterality Date    APPENDECTOMY  Sep 19, 2009    laparoscopic       Medications   Medications Prior to Admission   Medication Sig Dispense Refill Last Dose    amphetamine-dextroamphetamine (ADDERALL XR) 10 MG 24 hr capsule Take 10 mg by mouth daily   Past Month    cholecalciferol (VITAMIN D3) 25 mcg (1000 units) capsule Take 1,000 Units by mouth daily   Unknown    finasteride (PROPECIA) 1 MG tablet Take 1 mg by mouth daily   Past Month    ketoconazole (NIZORAL) 2 % external shampoo Apply topically three times a week   11/27/2023 at PM    loratadine-pseudoePHEDrine (CLARITIN-D 12-HOUR) 5-120 MG 12 hr tablet Take 1  tablet by mouth 2 times daily   Past Week          Review of Systems    The 10 point Review of Systems is negative other than noted in the HPI or here.      Physical Exam   Vital Signs: Temp: 98.7  F (37.1  C) Temp src: Oral BP: (!) 154/91 Pulse: 72   Resp: 18 SpO2: 100 % O2 Device: Nasal cannula Oxygen Delivery: 2 LPM  Weight: 201 lbs 11.53 oz    Physical Exam   Constitutional:   Well nourished, well developed, resting comfortably   HEENT: surgical changes, chin wrap/support  Cardiovascular: Regular rate and rhythm. 3/6 systolic murmur  Pulmonary/Chest: Clear to auscultation bilaterally, with no wheezes or retractions. No respiratory distress.  GI: Soft with good bowel sounds.  Non-tender, non-distended, with no guarding, no rebound, no peritoneal signs.   Musculoskeletal:  No edema or clubbing   Skin: Skin is warm and dry. No rash noted.   Neurological: Alert and oriented to person, place, and time. Nonfocal exam  Psychiatric:  Normal mood and affect.    Medical Decision Making     55 MINUTES SPENT BY ME on the date of service doing chart review, history, exam, documentation & further activities per the note.      Data         Imaging results reviewed over the past 24 hrs:   No results found for this or any previous visit (from the past 24 hour(s)).

## 2023-11-30 NOTE — PHARMACY-ADMISSION MEDICATION HISTORY
Pharmacy Intern Admission Medication History    Admission medication history is complete. The information provided in this note is only as accurate as the sources available at the time of the update.    Information Source(s): Patient, Family member, and CareEverywhere/SureScripts via in-person    Pertinent Information: Patient and his mother were both very knowledgeable of medications that he takes at home. Patient noted that he's using Claritin instead of Zyrtec for allergies. He hasn't been taking meds at home lately.    Changes made to PTA medication list:  Added:   Loratadine-pseudoephdrine (CLARITIN-D 12-HOUR) 5-120 mg 12 hr tablet - Take 1 tablet by mouth 2 times daily (Patient reported taking Claritin, not Zyrtec)  Deleted:   Cetirizine (ZYRTEC) 10 mg tablet - Take 1 tablet (10 mg) by mouth daily (Patient reported taking Claritin, not Zyrtec)  Changed: None    Allergies reviewed with patient and updates made in EHR: yes    Medication History Completed By: Hayeong Yun 11/29/2023 6:30 PM    Prior to Admission medications    Medication Sig Last Dose Taking? Auth Provider Long Term End Date   amphetamine-dextroamphetamine (ADDERALL XR) 10 MG 24 hr capsule Take 10 mg by mouth daily Past Month Yes Reported, Patient     cholecalciferol (VITAMIN D3) 25 mcg (1000 units) capsule Take 1,000 Units by mouth daily Unknown Yes Reported, Patient     finasteride (PROPECIA) 1 MG tablet Take 1 mg by mouth daily Past Month Yes Reported, Patient     ketoconazole (NIZORAL) 2 % external shampoo Apply topically three times a week 11/27/2023 at PM Yes Reported, Patient     loratadine-pseudoePHEDrine (CLARITIN-D 12-HOUR) 5-120 MG 12 hr tablet Take 1 tablet by mouth 2 times daily Past Week Yes Unknown, Entered By History

## 2023-11-30 NOTE — PROGRESS NOTES
ORAL & MAXILLOFACIAL SURGERY   PROGRESS NOTE  John Monteiro,  MRN: [id number],  : 1995    ASSESSMENT:  28 year old male s/p LeFort 1 osteotomy with a mandibular sagittal split bilateral on 23.      PLAN/RECOMMENDATIONS:  - Administer labetalol or hydralazine for a systolic greater than 170 mmHg and a diastolic greater than 100 mmHg  - Prioritize pain control with Scheduled and PRN medications. Please page OMFS on-call if pain is not being adequately managed.  - Tylenol was adjusted to 650 mg 6QH  - Ibuprofen was adjusted to 600 MG 6QH  - Oxycodone was adjusted to 5-10 mg 4QH  - Application of hydrocortisone cream 4QH   - Encourage PO intake with full liquid diet.   - Continue Unasyn.  - HOB elevated >30 degrees.   - Encourage ambulation.  - Ice compress to face PRN  - Vaseline at bed side  - suction at bedside   - Consult requested of Medicine for assistance with HTN    Discussed with resident Dr. Matt Romero, DMD  Oral & Maxillofacial Surgery, Intern    Please contact the OMFS resident on-call with questions or concerns.  ____________________________________    SUBJECTIVE:  John Monteiro, 27 y/o male s/p LeFort 1 osteotomy with a mandibular sagittal split bilateral on 23. Patient has been hypertensive since surgery with bp's running 160/. The patient has been endorsing moderate pain since surgery with pain around 5-7/10.  Patient was laying down and upright in hospital bed on exam.  He still has not ambulated to the bathroom and voided. He is tolerating clear liquids, but has not yet tried full liquids.     PHYSICAL EXAM:   Vitals:    23 1700 23 1730 23 1744 23 1809   BP: (!) 161/101 (!) 171/106 (!) 169/70 (!) 162/99   BP Location: Left arm Left arm Left arm Left arm   Pulse: 84 77 71 72   Resp: 18   16   Temp:    97.6  F (36.4  C)   TempSrc:    Axillary   SpO2: 100% 100% 100% 100%   Weight:       Height:            GEN: WD/WN male, NAD  NEURO:  "AAOx4, cranial nerves grossly intact with exception to bilateral infraorbital and mental nerve hypoesthesia  HEENT: head normocephalic, atraumatic, EOMI, PERRL, bilateral facial edema consistent with postoperative edema, soft and tender with no induration. Extraoral incisions are hemostatic with no dehiscence.   Oral: edema as expected, midline is on, CAROLINA about 10 m, maxillary gingiva and palate is well perfused, intraoral incisions are well approximated with presence of chromic gut sutures, no persistent bleeding. The blood in the mouth is dry. maxillary Occlusal splint secured to maxillary dentition and stable  Skin: no rashes or lesions. Jaw bra with ice packs are in place.   CV: no JVD appreciated, RRR   PULM: b/l = chest rise, breathing easily   Abd: non distended, non tender, soft   Ext: warm, well-perfused  PSYCH: Appropriate mood and affect    LABS:   CBC RESULTS: No results for input(s): \"WBC\", \"RBC\", \"HGB\", \"HCT\", \"MCV\", \"MCH\", \"MCHC\", \"RDW\", \"PLT\" in the last [id number] hours.   Last Comprehensive Metabolic Panel:  Sodium   Date Value Ref Range Status   09/19/2009 140 133 - 143 mmol/L Final     Potassium   Date Value Ref Range Status   09/19/2009 3.9 3.4 - 5.3 mmol/L Final     Chloride   Date Value Ref Range Status   09/19/2009 106 98 - 110 mmol/L Final     Carbon Dioxide   Date Value Ref Range Status   09/19/2009 27 20 - 32 mmol/L Final     Anion Gap   Date Value Ref Range Status   09/19/2009 7 6 - 17 mmol/L Final     Glucose   Date Value Ref Range Status   12/27/2012 90 60 - 99 mg/dL Final     Comment:     Non Fasting     GLUCOSE BY METER POCT   Date Value Ref Range Status   11/29/2023 90 70 - 99 mg/dL Final     Urea Nitrogen   Date Value Ref Range Status   09/19/2009 15 5 - 24 mg/dL Final     Creatinine   Date Value Ref Range Status   09/19/2009 0.49 0.39 - 0.73 mg/dL Final     Comment:     New IDMS-traceable calibration  beginning 5/1/08     GFR Estimate   Date Value Ref Range Status   09/19/2009 GFR not " calculated, patient <16 years old. mL/min/1.7m2 Final     Calcium   Date Value Ref Range Status   09/19/2009 9.8 8.7 - 10.8 mg/dL Final        RADIOLOGY:  No new imaging

## 2023-11-30 NOTE — PROGRESS NOTES
[facility]    Medicine Progress Note - Hospitalist Service, GOLD TEAM 10    Date of Admission:  11/29/2023    Assessment & Plan   John Monteiro is a 28 year old male admitted on 11/29/2023. 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 Other past medical history include HTN, cardiac murmur and hair loss and ADHD.      # Hypertension  Patient states that he has had elevated blood pressures for the last 2 months due to a new sedentary job and poor diet. He was due to follow up at his PCP but has not made another appointment as yet. He checks his blood pressures at home with a family member's sphygmomanometer. He has a known cardiac mumur with last echo in 2014. He has been having increased shortness of breath over the last few months as well.  Med review shows that he is on finasteride for hair loss, this can lower blood pressures, but he has not been consistent with taking it.  EKG normal, echo normal.  -  Renin and renin/aldosterone pending  -  Given elevated BPs have been a chronic issue and are not markedly elevated here, okay to discharge and follow up with primary care to review lab results and outpatient BP trends to determine if a daily oral medication is indicated  - Does not need to remain inpatient for current BPs or for lab results  - Optimize pain management   - Patient teaching on lifestyle modification for hypertension    #  S/P Lefort 1 osteotomy 11/29/23  Management per primary team    # ADHD  Held his adderal over the last few weeks due to hypertension            Diet: Full Liquid Diet Jaw Surgery  Diet    DVT Prophylaxis: Low Risk/Ambulatory with no VTE prophylaxis indicated  Cox Catheter: Not present  Lines: None     Cardiac Monitoring: None  Code Status: Full Code      Clinically Significant Risk Factors                         # Overweight: Estimated body mass index is 25.9 kg/m  as  "calculated from the following:    Height as of this encounter: 1.88 m (6' 2\").    Weight as of this encounter: 91.5 kg (201 lb 11.5 oz)., PRESENT ON ADMISSION            Disposition Plan      Expected Discharge Date: 12/01/2023                    Joyce Galindo MD  Hospitalist Service, GOLD TEAM 10  [facility]  Securely message with [employer] (more info)  Text page via [employer] Paging/Directory   See signed in provider for up to date coverage information  ______________________________________________________________________    Interval History   Nursing notes reviewed, no acute events overnight.  Having expected jaw pain.  Breathing is okay.    Physical Exam   Vital Signs: Temp: 98.2  F (36.8  C) Temp src: Axillary BP: (!) 157/91 Pulse: 84   Resp: 16 SpO2: 100 % O2 Device: Nasal cannula Oxygen Delivery: 2 LPM  Weight: 201 lbs 11.53 oz    General Appearance: Sitting in bed in NAD  Respiratory: CTAB  Cardiovascular: RRR, systolic murmur noted  GI: NABS, soft, NT, ND  Skin: No rashes over exposed skin  Neuro: Alert, interactive     Medical Decision Making       50 MINUTES SPENT BY ME on the date of service doing chart review, history, exam, documentation & further activities per the note.      Data     "

## 2023-11-30 NOTE — PROVIDER NOTIFICATION
NONURGENT: LEI Monteiro 6202  Pt BP is 174/99. Labetalol PRN dose is 10mg q4hrs. Do you want to give another dose?  thanks, sweetie [phone]    Page sent to [facility] Dr. Romero @ [phone]        NONURGENT: LEI Monteiro 6202  Pt most recent /91. What do you want his BP goal to be? His PRNs state to give if >170SBP >110 DBP  thanks, sweetie [phone]    Sent to [facility] @ 0008  Sent to medicine cross cover Dr. Strickland @ 0010  Response: Dr. Marco A mclean with BP currently, will see pt and place recs

## 2023-11-30 NOTE — PROVIDER NOTIFICATION
Provider Notification:    Data: Patient Blood pressures running 160's/'s    Action: Notified on call for parameters and meds.     Response:  Will put in parameters and medications    Plan:  Continue to monitor    Sunshine Adamson, FABIOLAN, RN, PHN, CNRN  Evening Charge Unit 6A  [phone]

## 2023-11-30 NOTE — PLAN OF CARE
Arrived from: PACU   Belongings/meds: computer, glasses,phone and clothes  2 RN Skin Assessment Completed by: susannah TIRADO and Kurt    Non-intact findings documented (yes/no/NA): bilat cheeks incision      Status: POD#0 [place] 1 osteotomy with a mandibular sagittal split bilateral on 11/29/23   Vitals: HTN. Md notified. on 2L NC. Cont. Pulse ox.  Neuros: Numbness to jaw/mouth area from surgery. Otherwise intact  IV: PIV SL  Resp/trach: Reported SOB. LSC  Diet:  clear liquid diet. Tolerated clear liquids well, good PO.  Bowel status: LBM PTA  : Voiding spontaneously in the BR  Skin: Bilateral cheek incisions, jaw bra on.   Pain: Partially managed with IV dilaudid, scheduled tylenol/ibuprofen, and ice packs  Activity: SBA  Social: Sister at BS. Supportive and participating in cares.  Plan: Pain management. Medicine following. ECHO to be done. Continue to monitor and follow.

## 2023-12-01 VITALS
OXYGEN SATURATION: 100 % | HEART RATE: 86 BPM | HEIGHT: 74 IN | WEIGHT: 201.72 LBS | DIASTOLIC BLOOD PRESSURE: 97 MMHG | SYSTOLIC BLOOD PRESSURE: 143 MMHG | RESPIRATION RATE: 16 BRPM | BODY MASS INDEX: 25.89 KG/M2 | TEMPERATURE: 98.3 F

## 2023-12-01 LAB
ALDOST SERPL-MCNC: 11.6 NG/DL (ref 0–31)
GLUCOSE BLDC GLUCOMTR-MCNC: 117 MG/DL (ref 70–99)

## 2023-12-01 PROCEDURE — 250N000013 HC RX MED GY IP 250 OP 250 PS 637

## 2023-12-01 PROCEDURE — 250N000013 HC RX MED GY IP 250 OP 250 PS 637: Performed by: STUDENT IN AN ORGANIZED HEALTH CARE EDUCATION/TRAINING PROGRAM

## 2023-12-01 PROCEDURE — 250N000011 HC RX IP 250 OP 636: Performed by: STUDENT IN AN ORGANIZED HEALTH CARE EDUCATION/TRAINING PROGRAM

## 2023-12-01 RX ORDER — OXYCODONE HCL 5 MG/5 ML
10 SOLUTION, ORAL ORAL EVERY 6 HOURS PRN
Qty: 120 ML | Refills: 0 | Status: SHIPPED | OUTPATIENT
Start: 2023-12-01 | End: 2024-05-21

## 2023-12-01 RX ADMIN — PSEUDOEPHEDRINE HYDROCHLORIDE 60 MG: 60 TABLET ORAL at 00:33

## 2023-12-01 RX ADMIN — ACETAMINOPHEN 650 MG: 325 SOLUTION ORAL at 08:06

## 2023-12-01 RX ADMIN — ACETAMINOPHEN 650 MG: 325 SOLUTION ORAL at 00:33

## 2023-12-01 RX ADMIN — GABAPENTIN 300 MG: 250 SOLUTION ORAL at 06:11

## 2023-12-01 RX ADMIN — ACETAMINOPHEN 650 MG: 325 SOLUTION ORAL at 04:28

## 2023-12-01 RX ADMIN — FAMOTIDINE 20 MG: 40 POWDER, FOR SUSPENSION ORAL at 04:32

## 2023-12-01 RX ADMIN — OXYCODONE HYDROCHLORIDE 10 MG: 5 SOLUTION ORAL at 04:36

## 2023-12-01 RX ADMIN — OXYCODONE HYDROCHLORIDE 10 MG: 5 SOLUTION ORAL at 00:33

## 2023-12-01 RX ADMIN — CHLORHEXIDINE GLUCONATE 15 ML: 1.2 SOLUTION ORAL at 08:06

## 2023-12-01 RX ADMIN — AMPICILLIN SODIUM AND SULBACTAM SODIUM 3 G: 2; 1 INJECTION, POWDER, FOR SOLUTION INTRAMUSCULAR; INTRAVENOUS at 04:36

## 2023-12-01 RX ADMIN — OXYCODONE HYDROCHLORIDE 10 MG: 5 SOLUTION ORAL at 08:51

## 2023-12-01 RX ADMIN — IBUPROFEN 600 MG: 200 SUSPENSION ORAL at 04:28

## 2023-12-01 ASSESSMENT — ACTIVITIES OF DAILY LIVING (ADL)
ADLS_ACUITY_SCORE: 26

## 2023-12-01 NOTE — PLAN OF CARE
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 IV BP meds PRN for SBP<170  Neuros: Numbness to jaw/mouth area from surgery. Otherwise intact  IV: PIV SL  Resp/trach: Reported SOB. LSC  Diet: Full liquid diet. Tolerated liquids well, good PO.  Bowel status: LBM 11/30/23  : Voiding spontaneously in the BR  Skin: Bilateral cheek incisions, jaw bra on.   Pain: Partially managed with PO oxycodone, scheduled tylenol/ibuprofen, and ice packs  Activity: ad fernanda  Social: Brother stayed overnight last night. Family supportive   Plan: Discharge today.

## 2023-12-02 LAB — RENIN PLAS-CCNC: 1.9 NG/ML/HR

## 2023-12-04 LAB — ALDOST/RENIN PLAS-RTO: 6.1 {RATIO} (ref 0–25)

## 2023-12-06 ENCOUNTER — OFFICE VISIT (OUTPATIENT)
Dept: INTERNAL MEDICINE | Facility: CLINIC | Age: 28
End: 2023-12-06
Payer: COMMERCIAL

## 2023-12-06 VITALS
BODY MASS INDEX: 21.17 KG/M2 | DIASTOLIC BLOOD PRESSURE: 87 MMHG | TEMPERATURE: 98.1 F | SYSTOLIC BLOOD PRESSURE: 148 MMHG | OXYGEN SATURATION: 100 % | WEIGHT: 165 LBS | HEART RATE: 97 BPM | HEIGHT: 74 IN

## 2023-12-06 DIAGNOSIS — F90.2 ADHD (ATTENTION DEFICIT HYPERACTIVITY DISORDER), COMBINED TYPE: ICD-10-CM

## 2023-12-06 DIAGNOSIS — I10 PRIMARY HYPERTENSION: Primary | ICD-10-CM

## 2023-12-06 DIAGNOSIS — Z11.59 NEED FOR HEPATITIS C SCREENING TEST: ICD-10-CM

## 2023-12-06 PROCEDURE — 99214 OFFICE O/P EST MOD 30 MIN: CPT | Performed by: INTERNAL MEDICINE

## 2023-12-06 RX ORDER — LISINOPRIL 10 MG/1
10 TABLET ORAL DAILY
Qty: 90 TABLET | Refills: 0 | Status: SHIPPED | OUTPATIENT
Start: 2023-12-06 | End: 2024-02-27

## 2023-12-06 NOTE — PROGRESS NOTES
Assessment & Plan     Primary hypertension  Not at goal  Start lisinopril 10 mg daily    Need for hepatitis C screening test  ordered    ADHD (attention deficit hyperactivity disorder), combined type  Off Medication and doing alright             MED REC REQUIRED  Post Medication Reconciliation Status:     Dinh Izaguirre MD  [facility] is a 28 year old, presenting for the following health issues:  Surgical Followup      HPI         Hospital Follow-up Visit:    Hospital/Nursing Home/IP Rehab Facility: [facility]  Date of Admission: 11/29/2023  Date of Discharge: 12/1/2023  Reason(s) for Admission: Maxillary Hypoplasia, oral surgery     Was your hospitalization related to COVID-19? No   Problems taking medications regularly:  None  Medication changes since discharge: None  Problems adhering to non-medication therapy:  None    Summary of hospitalization:  [facility] discharge summary reviewed  Diagnostic Tests/Treatments reviewed.  Follow up needed: none  Other Healthcare Providers Involved in Patient s Care:         Surgical follow-up appointment - completed  Update since discharge: improved.         Plan of care communicated with patient and family             Patient is here with his sister.    Has been having high blood pressure for a year.  Denies any family history of hypertension.    Had recent maxillary facial surgery and blood pressure was high.    Denies any chest pain or headache.    Has a history of ADHD and was taking Adderall but has been out of it for 1 month.  Has had ADHD since his middle school.  Doing okay without the medication.    Works as a .      Review of Systems   Constitutional, HEENT, cardiovascular, pulmonary, gi and gu systems are negative, except as otherwise noted.      Objective    BP (!) 148/87   Pulse 97   Temp 98.1  F (36.7  C) (Temporal)   Ht  "1.88 m (6' 2\")   Wt 74.8 kg (165 lb)   SpO2 100%   BMI 21.18 kg/m    Body mass index is 21.18 kg/m .  Physical Exam   GENERAL: healthy, alert and no distress  NECK: no adenopathy, no asymmetry, masses, or scars and thyroid normal to palpation  RESP: lungs clear to auscultation - no rales, rhonchi or wheezes  CV: regular rate and rhythm, normal S1 S2, no S3 or S4, no murmur, click or rub, no peripheral edema and peripheral pulses strong  ABDOMEN: soft, nontender, no hepatosplenomegaly, no masses and bowel sounds normal  MS: no gross musculoskeletal defects noted, no edema                      "

## 2023-12-06 NOTE — PATIENT INSTRUCTIONS
Patient Education   DASH Diet: Care Instructions  Your Care Instructions     The DASH diet is an eating plan that can help lower your blood pressure. DASH stands for Dietary Approaches to Stop Hypertension. Hypertension is high blood pressure.  The DASH diet focuses on eating foods that are high in calcium, potassium, and magnesium. These nutrients can lower blood pressure. The foods that are highest in these nutrients are fruits, vegetables, low-fat dairy products, nuts, seeds, and legumes. But taking calcium, potassium, and magnesium supplements instead of eating foods that are high in those nutrients does not have the same effect. The DASH diet also includes whole grains, fish, and poultry.  The DASH diet is one of several lifestyle changes your doctor may recommend to lower your high blood pressure. Your doctor may also want you to decrease the amount of sodium in your diet. Lowering sodium while following the DASH diet can lower blood pressure even further than just the DASH diet alone.  Follow-up care is a key part of your treatment and safety. Be sure to make and go to all appointments, and call your doctor if you are having problems. It's also a good idea to know your test results and keep a list of the medicines you take.  How can you care for yourself at home?  Following the DASH diet  Eat 4 to 5 servings of fruit each day. A serving is 1 medium-sized piece of fruit, 1/2 cup raw or canned fruit, 1/4 cup dried fruit, or 4 ounces (1/2 cup) of fruit juice. Choose fruit more often than fruit juice.  Eat 4 to 5 servings of vegetables each day. A serving is 1 cup of lettuce or raw leafy vegetables, 1/2 cup of chopped or cooked vegetables, or 4 ounces (1/2 cup) of vegetable juice. Choose vegetables more often than vegetable juice.  Get 2 to 3 servings of low-fat and fat-free dairy each day. A serving is 8 ounces of milk, 1 cup of yogurt, or 1  ounces of cheese.  Eat 6 to 8 servings of grains each day. A serving  is 1 slice of bread, 1 ounce of dry cereal, or 1/2 cup of cooked rice, pasta, or cooked cereal. Try to choose whole-grain products as much as possible.  Limit lean meat, poultry, and fish to 6 ounces or less each day. One egg counts as 1 ounce.  Eat 4 to 5 servings of nuts, seeds, and legumes (cooked dried beans, lentils, and split peas) each week. A serving is 1/3 cup of nuts, 2 tablespoons of seeds, 2 tablespoons of peanut butter, or 1/2 cup of cooked beans or peas.  Limit fats and oils to 2 to 3 servings each day. A serving is 1 teaspoon of vegetable oil or 2 tablespoons of salad dressing.  Limit sweets and added sugars to 5 servings or less a week. A serving is 1 tablespoon jelly or jam, 1/2 cup sorbet, or 1 cup of lemonade.  Eat less than 2,300 milligrams (mg) of sodium a day. If you limit your sodium to 1,500 mg a day, you can lower your blood pressure even more.  Be aware that all of these are the suggested number of servings for people who eat 1,800 to 2,000 calories a day. Your recommended number of servings may be different if you need more or fewer calories.  Tips for success  Start small. Make small changes, and stick with them. Once those changes become habit, add a few more changes.  Try some of the following:  Make it a goal to eat a fruit or vegetable at every meal and at snacks. This will make it easy to get the recommended amount of fruits and vegetables each day.  Try yogurt topped with fruit and nuts for a snack or healthy dessert.  Add lettuce, tomato, cucumber, and onion to sandwiches.  Have a variety of cut-up vegetables with a low-fat dip as an appetizer instead of chips and dip.  Sprinkle sunflower seeds or chopped almonds over salads. Or try adding chopped walnuts or almonds to cooked vegetables.  Try some vegetarian meals using beans and peas. Add garbanzo or kidney beans to salads. Make burritos and tacos with mashed fong beans or black beans.  Where can you learn more?  Go to  "https://www.[employer].net/patiented  Enter H967 in the search box to learn more about \"DASH Diet: Care Instructions.\"  Current as of: February 28, 2023               Content Version: 13.8    4716-0225 [employer].   Care instructions adapted under license by your healthcare professional. If you have questions about a medical condition or this instruction, always ask your healthcare professional. [employer] disclaims any warranty or liability for your use of this information.         "

## 2024-02-14 ENCOUNTER — OFFICE VISIT (OUTPATIENT)
Dept: FAMILY MEDICINE | Facility: CLINIC | Age: 29
End: 2024-02-14
Payer: COMMERCIAL

## 2024-02-14 VITALS
RESPIRATION RATE: 16 BRPM | SYSTOLIC BLOOD PRESSURE: 131 MMHG | DIASTOLIC BLOOD PRESSURE: 80 MMHG | TEMPERATURE: 97.9 F | OXYGEN SATURATION: 100 % | HEART RATE: 71 BPM | HEIGHT: 73 IN | BODY MASS INDEX: 26.11 KG/M2 | WEIGHT: 197 LBS

## 2024-02-14 DIAGNOSIS — Z00.00 PHYSICAL EXAM, ANNUAL: Primary | ICD-10-CM

## 2024-02-14 DIAGNOSIS — Z79.899 MEDICATION MANAGEMENT: ICD-10-CM

## 2024-02-14 DIAGNOSIS — R03.0 ELEVATED BLOOD PRESSURE READING WITHOUT DIAGNOSIS OF HYPERTENSION: ICD-10-CM

## 2024-02-14 DIAGNOSIS — F90.2 ADHD (ATTENTION DEFICIT HYPERACTIVITY DISORDER), COMBINED TYPE: ICD-10-CM

## 2024-02-14 DIAGNOSIS — Z80.0 FAMILY HISTORY OF COLON CANCER: ICD-10-CM

## 2024-02-14 PROCEDURE — 90715 TDAP VACCINE 7 YRS/> IM: CPT | Performed by: PHYSICIAN ASSISTANT

## 2024-02-14 PROCEDURE — 90472 IMMUNIZATION ADMIN EACH ADD: CPT | Performed by: PHYSICIAN ASSISTANT

## 2024-02-14 PROCEDURE — 99214 OFFICE O/P EST MOD 30 MIN: CPT | Mod: 25 | Performed by: PHYSICIAN ASSISTANT

## 2024-02-14 PROCEDURE — 90471 IMMUNIZATION ADMIN: CPT | Performed by: PHYSICIAN ASSISTANT

## 2024-02-14 PROCEDURE — 90651 9VHPV VACCINE 2/3 DOSE IM: CPT | Performed by: PHYSICIAN ASSISTANT

## 2024-02-14 PROCEDURE — 99395 PREV VISIT EST AGE 18-39: CPT | Mod: 25 | Performed by: PHYSICIAN ASSISTANT

## 2024-02-14 RX ORDER — LISDEXAMFETAMINE DIMESYLATE 20 MG/1
20 CAPSULE ORAL EVERY MORNING
Qty: 30 CAPSULE | Refills: 0 | Status: SHIPPED | OUTPATIENT
Start: 2024-02-14 | End: 2024-03-21

## 2024-02-14 SDOH — HEALTH STABILITY: PHYSICAL HEALTH: ON AVERAGE, HOW MANY MINUTES DO YOU ENGAGE IN EXERCISE AT THIS LEVEL?: 60 MIN

## 2024-02-14 SDOH — HEALTH STABILITY: PHYSICAL HEALTH: ON AVERAGE, HOW MANY DAYS PER WEEK DO YOU ENGAGE IN MODERATE TO STRENUOUS EXERCISE (LIKE A BRISK WALK)?: 5 DAYS

## 2024-02-14 ASSESSMENT — SOCIAL DETERMINANTS OF HEALTH (SDOH): HOW OFTEN DO YOU GET TOGETHER WITH FRIENDS OR RELATIVES?: MORE THAN THREE TIMES A WEEK

## 2024-02-14 NOTE — LETTER
[facility]  02/14/24  Patient: John Monteiro  YOB: 1995  Medical Record Number: [id number]                                                                                  Non-Opioid Controlled Substance Agreement    This is an agreement between you and your provider regarding safe and appropriate use of controlled substances prescribed by your care team. Controlled substances are?medicines that can cause physical and mental dependence (abuse).     There are strict laws about having and using these medicines. We here at [facility] are  committed to working with you in your efforts to get better. To support you in this work, we'll help you schedule regular office appointments for medicine refills. If we must cancel or change your appointment for any reason, we'll make sure you have enough medicine to last until your next appointment.     As a Provider, I will:   Listen carefully to your concerns while treating you with respect.   Recommend a treatment plan that I believe is in your best interest and may involve therapies other than medicine.    Talk with you often about the possible benefits and the risk of harm of any medicine that we prescribe for you.  Assess the safety of this medicine and check how well it works.    Provide a plan on how to taper (discontinue or go off) using this medicine if the decision is made to stop its use.      ::  As a Patient, I understand controlled substances:     Are prescribed by my care provider to help me function or work and manage my condition(s).?  Are strong medicines and can cause serious side effects.     Need to be taken exactly as prescribed.?Combining controlled substances with certain medicines or chemicals (such as illegal drugs, alcohol, sedatives, sleeping pills, and benzodiazepines) can be dangerous or even fatal.? If I stop taking my medicines suddenly, I may have severe withdrawal symptoms.     The risks, benefits,  and side effects of these medicine(s) were explained to me. I agree that:    I will take part in other treatments as advised by my care team. This may be psychiatry or counseling, physical therapy, behavioral therapy, group treatment or a referral to specialist.    I will keep all my appointments and understand this is part of the monitoring of controlled substances.?My care team may require an office visit for EVERY controlled substance refill. If I miss appointments or don t follow instructions, my care team may stop my medicine    I will take my medicines as prescribed. I will not change the dose or schedule unless my care team tells me to. There will be no refills if I run out early.      I may be asked to come to the clinic and complete a urine drug test or complete a pill count. If I don t give a urine sample or participate in a pill count, the care team may stop my medicine.    I will only receive controlled substance prescriptions from this clinic. If I am treated by another provider, I will tell them that I am taking controlled substances and that I have a treatment agreement with this provider. I will inform my [facility] care team within one business day if I am given a prescription for any controlled substance by another healthcare provider. My [facility] care team can contact other providers and pharmacists about my use of any medicines.    It is up to me to make sure that I don't run out of my medicines on weekends or holidays.?If my care team is willing to refill my prescription without a visit, I must request refills only during office hours. Refills may take up to 3 business days to process. I will use one pharmacy to fill all my controlled substance prescriptions. I will notify the clinic about any changes to my insurance or medicine availability.    I am responsible for my prescriptions. If the medicine/prescription is lost, stolen or destroyed, it will not be replaced.?I also agree  not to share controlled substance medicines with anyone.     I am aware I should not use any illegal or recreational drugs. I agree not to drink alcohol unless my care team says I can.     If I enroll in the [place] Medical Cannabis program, I will tell my care team before my next refill.    I will tell my care team right away if I become pregnant, have a new medical problem treated outside of my regular clinic, or have a change in my medicines.     I understand that this medicine can affect my thinking, judgment and reaction time.? Alcohol and drugs affect the brain and body, which can affect the safety of my driving. Being under the influence of alcohol or drugs can affect my decision-making, behaviors, personal safety and the safety of others. Driving while impaired (DWI) can occur if a person is driving, operating or in physical control of a car, motorcycle, boat, snowmobile, ATV, motorbike, off-road vehicle or any other motor vehicle ([place] Statute 169A.20). I understand the risk if I choose to drive or operate any vehicle or machinery.    I understand that if I do not follow any of the conditions above, my prescriptions or treatment may be stopped or changed.   I agree that my provider, clinic care team and pharmacy may work with any city, state or federal law enforcement agency that investigates the misuse, sale or other diversion of my controlled medicine. I will allow my provider to discuss my care with, or share a copy of, this agreement with any other treating provider, pharmacy or emergency room where I receive care.     I have read this agreement and have asked questions about anything I did not understand.    ________________________________________________________  Patient Signature - John Monteiro     ___________________                   Date     ________________________________________________________  Provider Signature - Elda El PA-C       ___________________                    Date     ________________________________________________________  Witness Signature (required if provider not present while patient signing)          ___________________                   Date

## 2024-02-14 NOTE — PROGRESS NOTES
"Preventive Care Visit  [facility]  Elda El PA-C, Family Medicine  Feb 14, 2024    Assessment & Plan     Physical exam, annual  - Lipid Profile (Chol, Trig, HDL, LDL calc); Future  - Testosterone, total; Future    ADHD (attention deficit hyperactivity disorder), combined type  -will trial Vyvanse.  Side effects of medication(s) discussed as well as risks/benefits of taking    -CSA completed, scanned to chart  -UDS pending  -plan follow-up in 4 weeks, sooner with concerns.   - lisdexamfetamine (VYVANSE) 20 MG capsule; Take 1 capsule (20 mg) by mouth every morning    Elevated blood pressure reading without diagnosis of hypertension  -recommend eval with cardiology.  Referral placed.    - Adult Cardiology Eval  Referral; Future    Family history of colon cancer  -pt would like a colonsocopy, referral placed.    - Adult GI  Referral - Procedure Only; Future    Medication management  - Urine Drug Screen Clinic; Future      BMI  Estimated body mass index is 26.11 kg/m  as calculated from the following:    Height as of this encounter: 1.85 m (6' 0.84\").    Weight as of this encounter: 89.4 kg (197 lb).       Counseling  Appropriate preventive services were discussed with this patient, including applicable screening as appropriate for fall prevention, nutrition, physical activity, Tobacco-use cessation, weight loss and cognition.  Checklist reviewing preventive services available has been given to the patient.  Reviewed patient's diet, addressing concerns and/or questions.       Olive Smith is a 28 year old, presenting for the following:  Physical (Pt is here for physical and blood work.)        2/14/2024     3:24 PM   Additional Questions   Roomed by China   Accompanied by with sister        Health Care Directive  Patient does not have a Health Care Directive or Living Will: Discussed advance care planning with patient; however, patient declined at this " time.    -new pt, was previously seen at [facility] and other [facility] clinics    -hx of ADHD and was evaluated, diagnosed and treated for this previously jing Kyler  -reviewed notes, diagnosis was in 2020  -did ok on Adderall XR 20mg but felt it did not last long enough for him  -has not had this for months    -has been told he has HTN  -monitors Bps at home, feels they are elevated  -Patient denies any exertional chest pain, dyspnea, palpitations, syncope, orthopnea, edema or paroxysmal nocturnal dyspnea.   -no significant family hx of HTN  -had eval with echo and EKG, labs in November  -denies any snoring  -feels he has had elevated Bps even when he has been off of Adderall for months.      -would like a colonoscopy due to family hx of colon cancer.  No immediate family hx of this    -would like to check his testosterone              2/14/2024   General Health   How would you rate your overall physical health? (!) FAIR   Feel stress (tense, anxious, or unable to sleep) Not at all         2/14/2024   Nutrition   Three or more servings of calcium each day? Yes   Diet: Regular (no restrictions)   How many servings of fruit and vegetables per day? (!) 2-3   How many sweetened beverages each day? 0-1         2/14/2024   Exercise   Days per week of moderate/strenous exercise 5 days   Average minutes spent exercising at this level 60 min         2/14/2024   Social Factors   Frequency of gathering with friends or relatives More than three times a week   Worry food won't last until get money to buy more No   Food not last or not have enough money for food? No   Do you have housing?  Yes   Are you worried about losing your housing? No   Lack of transportation? No   Unable to get utilities (heat,electricity)? No         2/14/2024   Dental   Dentist two times every year? Yes         2/14/2024   TB Screening   Were you born outside of [place]?  (!) YES           Today's PHQ-2 Score:       2/14/2024     1:20 PM   PHQ-2 ( 1999  "[employer])   Q1: Little interest or pleasure in doing things 0   Q2: Feeling down, depressed or hopeless 0   PHQ-2 Score 0   Q1: Little interest or pleasure in doing things Not at all   Q2: Feeling down, depressed or hopeless Not at all   PHQ-2 Score 0           2/14/2024   Substance Use   Alcohol more than 3/day or more than 7/wk No   Do you use any other substances recreationally? No     Social History     Tobacco Use    Smoking status: Never    Smokeless tobacco: Never   Vaping Use    Vaping Use: Never used   Substance Use Topics    Alcohol use: No    Drug use: No           2/14/2024   STI Screening   New sexual partner(s) since last STI/HIV test? No         2/14/2024   Contraception/Family Planning   Questions about contraception or family planning No        Reviewed and updated as needed this visit by Provider                          Review of Systems  Constitutional, HEENT, cardiovascular, pulmonary, gi and gu systems are negative, except as otherwise noted.     Objective    Exam  /80 (BP Location: Left arm, Patient Position: Sitting, Cuff Size: Adult Large)   Pulse 71   Temp 97.9  F (36.6  C) (Temporal)   Resp 16   Ht 1.85 m (6' 0.84\")   Wt 89.4 kg (197 lb)   SpO2 100%   BMI 26.11 kg/m     Estimated body mass index is 26.11 kg/m  as calculated from the following:    Height as of this encounter: 1.85 m (6' 0.84\").    Weight as of this encounter: 89.4 kg (197 lb).    Physical Exam  GENERAL: alert and no distress  EYES: Eyes grossly normal to inspection, PERRL and conjunctivae and sclerae normal  HENT: ear canals and TM's normal, nose and mouth without ulcers or lesions  NECK: no adenopathy, no asymmetry, masses, or scars  RESP: lungs clear to auscultation - no rales, rhonchi or wheezes  CV: regular rate and rhythm, normal S1 S2, no S3 or S4, no murmur, click or rub, no peripheral edema  ABDOMEN: soft, nontender, no hepatosplenomegaly, no masses and bowel sounds normal  MS: no gross musculoskeletal " defects noted, no edema  SKIN: no suspicious lesions or rashes  NEURO: Normal strength and tone, mentation intact and speech normal  PSYCH: mentation appears normal, affect normal/bright      Signed Electronically by: Elda El PA-C  Prior to immunization administration, verified patients identity using patient s name and date of birth. Please see Immunization Activity for additional information.     Screening Questionnaire for Adult Immunization    Are you sick today?   No   Do you have allergies to medications, food, a vaccine component or latex?   No   Have you ever had a serious reaction after receiving a vaccination?   No   Do you have a long-term health problem with heart, lung, kidney, or metabolic disease (e.g., diabetes), asthma, a blood disorder, no spleen, complement component deficiency, a cochlear implant, or a spinal fluid leak?  Are you on long-term aspirin therapy?   No   Do you have cancer, leukemia, HIV/AIDS, or any other immune system problem?   No   Do you have a parent, brother, or sister with an immune system problem?   No   In the past 3 months, have you taken medications that affect  your immune system, such as prednisone, other steroids, or anticancer drugs; drugs for the treatment of rheumatoid arthritis, Crohn s disease, or psoriasis; or have you had radiation treatments?   No   Have you had a seizure, or a brain or other nervous system problem?   No   During the past year, have you received a transfusion of blood or blood    products, or been given immune (gamma) globulin or antiviral drug?   No   For women: Are you pregnant or is there a chance you could become       pregnant during the next month?   No   Have you received any vaccinations in the past 4 weeks?   No     Immunization questionnaire answers were all negative.      Patient instructed to remain in clinic for 15 minutes afterwards, and to report any adverse reactions.     Screening performed by China Hinojosa MA  on 2/14/2024 at 3:29 PM.

## 2024-02-27 DIAGNOSIS — I10 PRIMARY HYPERTENSION: ICD-10-CM

## 2024-02-27 RX ORDER — LISINOPRIL 10 MG/1
10 TABLET ORAL DAILY
Qty: 30 TABLET | Refills: 0 | Status: SHIPPED | OUTPATIENT
Start: 2024-02-27 | End: 2024-05-21

## 2024-02-28 NOTE — TELEPHONE ENCOUNTER
Called pt, pt states that we can cancel the refill, because he does not take the medication anymore.  Mercedes Goldsmith VF

## 2024-03-07 ENCOUNTER — TELEPHONE (OUTPATIENT)
Dept: GASTROENTEROLOGY | Facility: CLINIC | Age: 29
End: 2024-03-07
Payer: COMMERCIAL

## 2024-03-07 NOTE — TELEPHONE ENCOUNTER
Pre assessment completed for upcoming procedure.      Procedure details:    Patient scheduled for Colonoscopy on 3/14/24.     Arrival time: 0845. Procedure time 0930    Pre op exam needed? N/A    Facility location: [facility]; [address] [place] [address] [place] [address]    Sedation type: Conscious sedation     Indication for procedure: Screening, Family history of colon cancer    Designated  policy reviewed. Instructed to have someone stay 6 hours post procedure.       Chart review:     Electronic implanted devices? No    Recent diagnosis of diverticulitis within the last 6 weeks?  No    Diabetic? No      Medication review:    Anticoagulants? No    NSAIDS? Yes.  Ibuprofen (Advil, Motrin).  Holding interval of 1 day before procedure.    Other medication HOLDING recommendations:  Iron supplements (Multivitamin): HOLD 7 days before procedure.      Prep for procedure:     Bowel prep recommendation: Standard Miralax  Due to: standard bowel prep.    Prep instructions sent via [employer]     Reviewed procedure prep instructions.     Patient verbalized understanding and had no questions or concerns at this time.        Prachi Edwards RN  Endoscopy Procedure Pre Assessment RN  [phone] option 4

## 2024-03-07 NOTE — TELEPHONE ENCOUNTER
"Endoscopy Scheduling Screen    Have you had a positive Covid test in the last 14 days?  No    Are you active on MyChart?   Yes    What insurance is in the chart?  Other:  BC[employer]    Ordering/Referring Provider:   JUDITH BAUER        (If ordering provider performs procedure, schedule with ordering provider unless otherwise instructed. )    BMI: Estimated body mass index is 26.11 kg/m  as calculated from the following:    Height as of 2/14/24: 1.85 m (6' 0.84\").    Weight as of 2/14/24: 89.4 kg (197 lb).     Sedation Ordered  moderate sedation.   If patient BMI > 50 do not schedule in [facility].    If patient BMI > 45 do not schedule at [facility].    Are you taking methadone or Suboxone?  No    Have you had difficulties, pain, or discomfort during past endoscopy procedures?  No    Are you taking any prescription medications for pain 3 or more times per week?   NO - No RN review required.    Do you have a history of malignant hyperthermia or adverse reaction to anesthesia?  No    (Females) Are you currently pregnant?   No     Have you been diagnosed or told you have pulmonary hypertension?   No    Do you have an LVAD?  No    Have you been told you have moderate to severe sleep apnea?  No    Have you been told you have COPD, asthma, or any other lung disease?  No    Do you have any heart conditions?  No     Have you ever had an organ transplant?   No    Have you ever had or are you awaiting a heart or lung transplant?   No    Have you had a stroke or transient ischemic attack (TIA aka \"mini stroke\" in the last 6 months?   No    Have you been diagnosed with or been told you have cirrhosis of the liver?   No    Are you currently on dialysis?   No    Do you need assistance transferring?   No    BMI: Estimated body mass index is 26.11 kg/m  as calculated from the following:    Height as of 2/14/24: 1.85 m (6' 0.84\").    Weight as of 2/14/24: 89.4 kg (197 lb).     Is patients BMI > 40 and scheduling location [facility]?  No    Do you " take an injectable medication for weight loss or diabetes (excluding insulin)?  No    Do you take the medication Naltrexone?  No    Do you take blood thinners?  No       Prep   Are you currently on dialysis or do you have chronic kidney disease?  No    Do you have a diagnosis of diabetes?  No    Do you have a diagnosis of cystic fibrosis (CF)?  No    On a regular basis do you go 3 -5 days between bowel movements?  No    BMI > 40?  No    Preferred Pharmacy:    [id number] - [place], [place] - [address] AT [address]  [address] [facility] [address]  Phone: [phone] Fax: [phone]    [facility] [place] [facility] [address]  [address] [facility] [address]  Phone: [phone] Fax: [phone]    [id number] [place], [place] - [address] AT [facility] [address] [facility] AND [address]  [address]  [facility] [address]  Phone: [phone] Fax: [phone]    [facility] [place], [place] - [address]  [address]  [facility] [address]  Phone: [phone] Fax: [phone]    [id number]  [place] - [address] [facility]  [address] [facility] [address]  Phone: [phone] Fax: [phone]      Final Scheduling Details   Colonoscopy prep sent?  N/A    Procedure scheduled  Colonoscopy    Surgeon:  Dinesh     Date of procedure:  3/14     Pre-OP / PAC:   No - Not required for this site.    Location  [facility] - Patient preference.    Sedation   Moderate Sedation - Per order.      Patient Reminders:   You will receive a call from a Nurse to review instructions and health history.  This assessment must be completed prior to your procedure.  Failure to complete the Nurse assessment may result in the procedure being cancelled.      On the day of your procedure, please designate an adult(s) who can drive you home stay with you for the next 24 hours. The medicines used in the exam will make you sleepy. You will  not be able to drive.      You cannot take public transportation, ride share services, or non-medical taxi service without a responsible caregiver.  Medical transport services are allowed with the requirement that a responsible caregiver will receive you at your destination.  We require that drivers and caregivers are confirmed prior to your procedure.

## 2024-03-12 DIAGNOSIS — I10 PRIMARY HYPERTENSION: ICD-10-CM

## 2024-03-12 RX ORDER — LISINOPRIL 10 MG/1
10 TABLET ORAL DAILY
Qty: 90 TABLET | Refills: 1 | OUTPATIENT
Start: 2024-03-12

## 2024-03-13 ENCOUNTER — TELEPHONE (OUTPATIENT)
Dept: GASTROENTEROLOGY | Facility: CLINIC | Age: 29
End: 2024-03-13
Payer: COMMERCIAL

## 2024-03-13 NOTE — TELEPHONE ENCOUNTER
Rescheduled procedure    Patient scheduled for Colonoscopy  on 3/21/24.    Arrival time: 1045. Procedure time 1130    Pre op exam needed? N/A    Facility location: [facility]; [address] [place] [address]    Sedation type: Conscious sedation     Pre-Assessment was completed for previously scheduled procedure. (See documentation below).  No new medical events or medications since last review.     Resent prep instructions via [employer].    RN spoke with Patient  and reviewed information. They have no further questions or concerns at this time.       Prachi Edwards RN  Endoscopy Procedure Pre Assessment RN

## 2024-03-13 NOTE — TELEPHONE ENCOUNTER
Caller: patient    Reason for Reschedule/Cancellation   (please be detailed, any staff messages or encounters to note?): schedule       Prior to reschedule please review:  Ordering Provider: JUDITH BAUER   Sedation Determined: CS  Does patient have any ASC Exclusions, please identify?: no      Notes on Cancelled Procedure:  Procedure: Lower Endoscopy [Colonoscopy]   Date: 3/14  Location: [facility]; [address] [place] [address]   Surgeon: suzan      Rescheduled: Yes,   Procedure: Lower Endoscopy [Colonoscopy]    Date: 3/21   Location: [facility]; [address] [place] [address]    Surgeon: suzan   Sedation Level Scheduled  CS ,  Reason for Sedation Level per order   Instructions updated and sent: jak

## 2024-03-20 NOTE — TELEPHONE ENCOUNTER
Caller: John Monteiro     Reason for Reschedule/Cancellation   (please be detailed, any staff messages or encounters to note?): conflict with work schedule      Prior to reschedule please review:  Ordering Provider: tisha  Sedation Determined: moderate  Does patient have any ASC Exclusions, please identify?: n      Notes on Cancelled Procedure:  Procedure: Lower Endoscopy [Colonoscopy]   Date: 3/21  Location: [facility]; [address] [place] [address]   Surgeon: suzan      Rescheduled: Yes,   Procedure: Lower Endoscopy [Colonoscopy]    Date: 3/27   Location: [facility]; [address] [place] [address]    Surgeon: Suzan   Sedation Level Scheduled  moderate ,  Reason for Sedation Level ordered   Instructions updated and sent: y     Does patient need PAC or Pre -Op Rescheduled? : no       Did you cancel or rescheduled an EUS procedure? No.

## 2024-03-21 ENCOUNTER — MYC REFILL (OUTPATIENT)
Dept: FAMILY MEDICINE | Facility: CLINIC | Age: 29
End: 2024-03-21
Payer: COMMERCIAL

## 2024-03-21 DIAGNOSIS — F90.2 ADHD (ATTENTION DEFICIT HYPERACTIVITY DISORDER), COMBINED TYPE: ICD-10-CM

## 2024-03-22 RX ORDER — LISDEXAMFETAMINE DIMESYLATE 20 MG/1
20 CAPSULE ORAL EVERY MORNING
Qty: 30 CAPSULE | Refills: 0 | Status: SHIPPED | OUTPATIENT
Start: 2024-03-22 | End: 2024-03-25

## 2024-03-26 ENCOUNTER — TELEPHONE (OUTPATIENT)
Dept: GASTROENTEROLOGY | Facility: CLINIC | Age: 29
End: 2024-03-26
Payer: COMMERCIAL

## 2024-03-26 NOTE — TELEPHONE ENCOUNTER
Caller: John Monteiro     Reason for Reschedule/Cancellation   (please be detailed, any staff messages or encounters to note?): work conflict and can't get out of it      Prior to reschedule please review:  Ordering Provider: Nikko  Sedation Determined: moderate  Does patient have any ASC Exclusions, please identify?: n      Notes on Cancelled Procedure:  Procedure: Lower Endoscopy [Colonoscopy]   Date: 3/27  Location: [facility]; [address] [place] [address]   Surgeon: Dinesh      Rescheduled: Yes,   Procedure: Lower Endoscopy [Colonoscopy]    Date: 4/3   Location: [facility]; [address] [place] [address]    Surgeon: Pedro   Sedation Level Scheduled  moderate ,  Reason for Sedation Level ordered   Instructions updated and sent: y     Does patient need PAC or Pre -Op Rescheduled? : no       Did you cancel or rescheduled an EUS procedure? No.

## 2024-03-26 NOTE — TELEPHONE ENCOUNTER
Rescheduled procedure    Patient scheduled for Colonoscopy  on 4/3/2024.    Arrival time: 0945. Procedure time 1030    Pre op exam needed? N/A    Facility location: [facility]; [address] [place] [address]    Sedation type: Conscious sedation     Pre-Assessment was completed for previously scheduled procedure. (See documentation below).  No new medical events or medications since last review.     Resent prep instructions via [employer].    Left a message for Patient  to return call to pre-assessment team at [phone] option 4 if they have any questions regarding rescheduled procedure and/or how to prepare.    Valentina Moya RN  Endoscopy Procedure Pre Assessment RN

## 2024-04-03 ENCOUNTER — HOSPITAL ENCOUNTER (OUTPATIENT)
Facility: CLINIC | Age: 29
Discharge: HOME | End: 2024-04-03
Attending: STUDENT IN AN ORGANIZED HEALTH CARE EDUCATION/TRAINING PROGRAM | Admitting: STUDENT IN AN ORGANIZED HEALTH CARE EDUCATION/TRAINING PROGRAM
Payer: COMMERCIAL

## 2024-04-03 VITALS
HEART RATE: 60 BPM | OXYGEN SATURATION: 98 % | SYSTOLIC BLOOD PRESSURE: 124 MMHG | RESPIRATION RATE: 19 BRPM | BODY MASS INDEX: 25.58 KG/M2 | WEIGHT: 193 LBS | DIASTOLIC BLOOD PRESSURE: 78 MMHG

## 2024-04-03 LAB — COLONOSCOPY: NORMAL

## 2024-04-03 PROCEDURE — 45378 DIAGNOSTIC COLONOSCOPY: CPT | Performed by: STUDENT IN AN ORGANIZED HEALTH CARE EDUCATION/TRAINING PROGRAM

## 2024-04-03 PROCEDURE — 250N000011 HC RX IP 250 OP 636: Performed by: STUDENT IN AN ORGANIZED HEALTH CARE EDUCATION/TRAINING PROGRAM

## 2024-04-03 PROCEDURE — G0500 MOD SEDAT ENDO SERVICE >5YRS: HCPCS | Performed by: STUDENT IN AN ORGANIZED HEALTH CARE EDUCATION/TRAINING PROGRAM

## 2024-04-03 RX ORDER — NALOXONE HYDROCHLORIDE 0.4 MG/ML
0.4 INJECTION, SOLUTION INTRAMUSCULAR; INTRAVENOUS; SUBCUTANEOUS
Status: DISCONTINUED | OUTPATIENT
Start: 2024-04-03 | End: 2024-04-03 | Stop reason: HOSPADM

## 2024-04-03 RX ORDER — NALOXONE HYDROCHLORIDE 0.4 MG/ML
0.2 INJECTION, SOLUTION INTRAMUSCULAR; INTRAVENOUS; SUBCUTANEOUS
Status: DISCONTINUED | OUTPATIENT
Start: 2024-04-03 | End: 2024-04-03 | Stop reason: HOSPADM

## 2024-04-03 RX ORDER — FENTANYL CITRATE 50 UG/ML
INJECTION, SOLUTION INTRAMUSCULAR; INTRAVENOUS PRN
Status: DISCONTINUED | OUTPATIENT
Start: 2024-04-03 | End: 2024-04-03 | Stop reason: HOSPADM

## 2024-04-03 RX ORDER — ONDANSETRON 4 MG/1
4 TABLET, ORALLY DISINTEGRATING ORAL EVERY 6 HOURS PRN
Status: DISCONTINUED | OUTPATIENT
Start: 2024-04-03 | End: 2024-04-03 | Stop reason: HOSPADM

## 2024-04-03 RX ORDER — PROCHLORPERAZINE MALEATE 10 MG
10 TABLET ORAL EVERY 6 HOURS PRN
Status: DISCONTINUED | OUTPATIENT
Start: 2024-04-03 | End: 2024-04-03 | Stop reason: HOSPADM

## 2024-04-03 RX ORDER — EPINEPHRINE 1 MG/ML
0.1 INJECTION, SOLUTION, CONCENTRATE INTRAVENOUS
Status: DISCONTINUED | OUTPATIENT
Start: 2024-04-03 | End: 2024-04-03 | Stop reason: HOSPADM

## 2024-04-03 RX ORDER — DIPHENHYDRAMINE HYDROCHLORIDE 50 MG/ML
25-50 INJECTION INTRAMUSCULAR; INTRAVENOUS
Status: DISCONTINUED | OUTPATIENT
Start: 2024-04-03 | End: 2024-04-03 | Stop reason: HOSPADM

## 2024-04-03 RX ORDER — ATROPINE SULFATE 0.1 MG/ML
1 INJECTION INTRAVENOUS
Status: DISCONTINUED | OUTPATIENT
Start: 2024-04-03 | End: 2024-04-03 | Stop reason: HOSPADM

## 2024-04-03 RX ORDER — FLUMAZENIL 0.1 MG/ML
0.2 INJECTION, SOLUTION INTRAVENOUS
Status: DISCONTINUED | OUTPATIENT
Start: 2024-04-03 | End: 2024-04-03 | Stop reason: HOSPADM

## 2024-04-03 RX ORDER — ONDANSETRON 2 MG/ML
4 INJECTION INTRAMUSCULAR; INTRAVENOUS EVERY 6 HOURS PRN
Status: DISCONTINUED | OUTPATIENT
Start: 2024-04-03 | End: 2024-04-03 | Stop reason: HOSPADM

## 2024-04-03 RX ORDER — ONDANSETRON 2 MG/ML
4 INJECTION INTRAMUSCULAR; INTRAVENOUS
Status: DISCONTINUED | OUTPATIENT
Start: 2024-04-03 | End: 2024-04-03 | Stop reason: HOSPADM

## 2024-04-03 RX ORDER — SIMETHICONE 40MG/0.6ML
133 SUSPENSION, DROPS(FINAL DOSAGE FORM)(ML) ORAL
Status: DISCONTINUED | OUTPATIENT
Start: 2024-04-03 | End: 2024-04-03 | Stop reason: HOSPADM

## 2024-04-03 RX ORDER — LIDOCAINE 40 MG/G
CREAM TOPICAL
Status: DISCONTINUED | OUTPATIENT
Start: 2024-04-03 | End: 2024-04-03 | Stop reason: HOSPADM

## 2024-04-03 RX ORDER — FENTANYL CITRATE 50 UG/ML
50-100 INJECTION, SOLUTION INTRAMUSCULAR; INTRAVENOUS EVERY 5 MIN PRN
Status: DISCONTINUED | OUTPATIENT
Start: 2024-04-03 | End: 2024-04-03 | Stop reason: HOSPADM

## 2024-04-03 ASSESSMENT — ACTIVITIES OF DAILY LIVING (ADL)
ADLS_ACUITY_SCORE: 35

## 2024-04-03 NOTE — DISCHARGE INSTRUCTIONS
Post Endoscopy Care  Activity  Do not drive for at least twenty-four (24) hours after the procedure. It takes at least that long for the medication to wear off completely. During this time, your judgment and reflexes will be impaired. Even though you may feel awake and alert, we suggest that you do not make important decisions during this time and remain off the job until the day following the procedure.   Diet  You may have a light meal following the examination and resume your regular diet later the same day or the following day.   Drink plenty of fluids. You may tend to be constipated for a day or so following the examination. If you tend to be constipated normally you should begin taking a stool softening agent immediately. NO LAXATIVES.   Side Effects    After the procedure, you may experience increased gas, bloating or cramping. Mild abdominal pain lasting 1-2 hours is common. Passing flatus (gas) and belching (burping) is encouraged.     A small amount of rectal bleeding may occur particularly if a polyp has been removed or a biopsy was taken.    If a polyp has been removed, do not take aspirin products or NSAIDS (such as Motrin or Advil) for seven (7) days unless given permission to do so. Tylenol is o.k.    If you experience severe abdominal pain, fevers >100.4, severe nausea/vomiting, continuous bleeding, passage of clots, or feeling weak (faint) immediately call [phone] or go to the emergency room for evaluation.      Follow Up  If a biopsy was done or any tissue was removed, we will send you a letter or call you with the result over the next few weeks. If you do not receive your results or would like to set up an appointment to discuss the findings please call: [facility] [phone].

## 2024-04-16 ENCOUNTER — MYC REFILL (OUTPATIENT)
Dept: FAMILY MEDICINE | Facility: CLINIC | Age: 29
End: 2024-04-16
Payer: COMMERCIAL

## 2024-04-16 DIAGNOSIS — F90.2 ADHD (ATTENTION DEFICIT HYPERACTIVITY DISORDER), COMBINED TYPE: ICD-10-CM

## 2024-04-17 RX ORDER — LISDEXAMFETAMINE DIMESYLATE 10 MG/1
20 CAPSULE ORAL EVERY MORNING
Qty: 60 CAPSULE | Refills: 0 | Status: SHIPPED | OUTPATIENT
Start: 2024-04-17 | End: 2024-05-25

## 2024-05-21 ENCOUNTER — OFFICE VISIT (OUTPATIENT)
Dept: CARDIOLOGY | Facility: CLINIC | Age: 29
End: 2024-05-21
Attending: PHYSICIAN ASSISTANT
Payer: COMMERCIAL

## 2024-05-21 VITALS
WEIGHT: 206 LBS | BODY MASS INDEX: 27.3 KG/M2 | DIASTOLIC BLOOD PRESSURE: 86 MMHG | HEART RATE: 61 BPM | HEIGHT: 73 IN | SYSTOLIC BLOOD PRESSURE: 144 MMHG

## 2024-05-21 DIAGNOSIS — I10 BENIGN ESSENTIAL HYPERTENSION: Primary | ICD-10-CM

## 2024-05-21 DIAGNOSIS — R07.89 CHEST DISCOMFORT: ICD-10-CM

## 2024-05-21 DIAGNOSIS — R03.0 ELEVATED BLOOD PRESSURE READING WITHOUT DIAGNOSIS OF HYPERTENSION: ICD-10-CM

## 2024-05-21 DIAGNOSIS — R94.31 ABNORMAL ELECTROCARDIOGRAM: ICD-10-CM

## 2024-05-21 PROCEDURE — 93000 ELECTROCARDIOGRAM COMPLETE: CPT | Performed by: INTERNAL MEDICINE

## 2024-05-21 PROCEDURE — 99204 OFFICE O/P NEW MOD 45 MIN: CPT | Performed by: INTERNAL MEDICINE

## 2024-05-21 NOTE — PROGRESS NOTES
HPI and Plan:   Sarah is a 28-year-old gentleman with ADHD, recent surgery for maxillary hypoplasia, who is now referred for evaluation for hypertension.    EKG demonstrates normal sinus rhythm with increased voltage and repolarization changes.  Echocardiogram appears to be normal without significant valvular pathology, left ventricular hypertrophy or any significant valvular dysfunction.    Patient states he first became aware of his hypertension last summer.  Previously had never been checked.  He has been on a series of medications that can contribute to hypertension including Vyvanse, Adderall, gabapentin, ibuprofen.  He is now on off of these except for the Vyvanse.    To his knowledge high blood pressure does not run in his family.  Both parents are alive and well mother is 64 father is 72.  He is a lifelong non-smoker.  He does not drink alcohol.  Does not have diabetes mellitus.  Cholesterol status is unknown.    He states he does not follow any special diet.  He does not exercise.  He does get chest discomfort but this occurs most often just with lying down and not necessarily with exercise.  He was on lisinopril for a while for blood pressure but is no longer on that.  He states previously he did not sleep well at nighttime and this is one of the reasons for surgery.  He does think he is sleeping better now.    Review of the records shows a normal aldosterone renin ratio, normal creatinine and electrolytes his most recent CBC does show mild anemia with a hemoglobin of 11.5 but this may have been a postoperative hemoglobin.    Assessment and plan.  Sarah has no symptoms to suggest ischemia, heart failure or significant arrhythmia.  His chest discomfort is quite atypical and I suspect not coronary artery disease.    He states his blood pressure at home waxes and wanes.  At times well-controlled at other times in the 140s.    Blood pressure is high today.  I have asked that he check his blood pressure at home  on a regular basis.  I will set him up for a stress echo.  This will allow us to see what his rhythm, blood pressure does with exercise as well as rule out any underlying coronary artery disease.  As stated above I do not think his chest discomfort is ischemic in origin.    I will check a renal ultrasound because of his young age to have blood pressure.    We talked about the importance of a low-salt diet high in fresh fruits and vegetables and regular exercise as nonmedicinal ways to lower his blood pressure.    Further evaluation treatment will depend upon the above results.  Thank you for allow me to participate in this patient's care.  Sincerely,                               Evens Stafford MD [facility]    The longitudinal plan of care for the diagnosis(es)/condition(s) as documented were addressed during this visit. Due to the added complexity in care, I will continue to support Sarah in the subsequent management and with ongoing continuity of care.     Today's clinic visit entailed:  Review of the result(s) of each unique test - lab work, echocardiogram, EKG  Ordering of each unique test  Prescription drug management  45 minutes spent by me on the date of the encounter doing chart review, history and exam, documentation and further activities per the note  Provider  Link to [facility] Help Grid     The level of medical decision making during this visit was of moderate complexity.      Orders Placed This Encounter   Procedures    US Renal Complete w Doppler Complete    EKG 12-lead complete w/read - Clinics (performed today)    Exercise Stress Echocardiogram       No orders of the defined types were placed in this encounter.      Medications Discontinued During This Encounter   Medication Reason    acetaminophen (TYLENOL) 160 MG/5ML solution     benzocaine-menthol (CEPACOL) 15-3.6 MG lozenge     chlorhexidine (PERIDEX) 0.12 % solution     gabapentin (NEURONTIN) 250 MG/5ML solution     hydrocortisone (CORTAID) 1 %  external cream     ibuprofen (ADVIL/MOTRIN) 100 MG/5ML suspension     lisinopril (ZESTRIL) 10 MG tablet     ondansetron (ZOFRAN ODT) 4 MG ODT tab     oxyCODONE (ROXICODONE) 5 MG/5ML solution     sodium chloride (OCEAN) 0.65 % nasal spray     amphetamine-dextroamphetamine (ADDERALL XR) 10 MG 24 hr capsule     cholecalciferol (VITAMIN D3) 25 mcg (1000 units) capsule     finasteride (PROPECIA) 1 MG tablet     loratadine-pseudoePHEDrine (CLARITIN-D 12-HOUR) 5-120 MG 12 hr tablet          Encounter Diagnoses   Name Primary?    Elevated blood pressure reading without diagnosis of hypertension     Benign essential hypertension Yes    Chest discomfort     Abnormal electrocardiogram        CURRENT MEDICATIONS:  Current Outpatient Medications   Medication Sig Dispense Refill    ketoconazole (NIZORAL) 2 % external shampoo Apply topically three times a week      lisdexamfetamine (VYVANSE) 10 MG capsule Take 2 capsules (20 mg) by mouth every morning 60 capsule 0       ALLERGIES     Allergies   Allergen Reactions    Seasonal Allergies      Ragweed and trees       PAST MEDICAL HISTORY:  Past Medical History:   Diagnosis Date    Abdominal pain 12/18/2012    ADHD     Ankle sprain 2019    Per patient report- R ankle roll when playing basketball in Spring 2019    Appendicitis 09/19/2009    Benign essential hypertension 5/21/2024    Distal radius fracture 10/23/2021    Elbow fracture 07/25/2018    Pneumonia 04/16/2009       PAST SURGICAL HISTORY:  Past Surgical History:   Procedure Laterality Date    APPENDECTOMY  09/19/2009    laparoscopic    COLONOSCOPY N/A 4/3/2024    Procedure: Colonoscopy;  Surgeon: Mario Vasquez MD;  Location:  GI    LE FORT ONE , OSTEOTOMY SAGITTAL SPLIT, COMBINED Bilateral 11/29/2023    Procedure: OSTEOTOMY, LE FORT I, WITH MANDIBULAR SAGITTAL SPLIT bilateral;  Surgeon: Betty Reyes DDS;  Location: [facility]    ORTHOPEDIC SURGERY  11/29/2023    Double jaw surgery for maxillary hypoplasia       FAMILY  HISTORY:  Family History   Problem Relation Age of Onset    Lipids Mother     Family History Negative Father     Colon Cancer Other     Other Cancer Other         Stomach cancer       SOCIAL HISTORY:  Social History     Socioeconomic History    Marital status: Single     Spouse name: None    Number of children: None    Years of education: None    Highest education level: None   Occupational History    Occupation: Student    Occupation:    Tobacco Use    Smoking status: Never    Smokeless tobacco: Never   Vaping Use    Vaping status: Never Used   Substance and Sexual Activity    Alcohol use: No    Drug use: No    Sexual activity: Yes     Partners: Female     Birth control/protection: Condom   Other Topics Concern    Parent/sibling w/ CABG, MI or angioplasty before 65F 55M? No   Social History Narrative    2/2024        Lives with mom    Works as     Enjoys reading, basketball     Social Determinants of Health     Financial Resource Strain: Low Risk  (2/14/2024)    Financial Resource Strain     Within the past 12 months, have you or your family members you live with been unable to get utilities (heat, electricity) when it was really needed?: No   Food Insecurity: Low Risk  (2/14/2024)    Food Insecurity     Within the past 12 months, did you worry that your food would run out before you got money to buy more?: No     Within the past 12 months, did the food you bought just not last and you didn t have money to get more?: No   Transportation Needs: Low Risk  (2/14/2024)    Transportation Needs     Within the past 12 months, has lack of transportation kept you from medical appointments, getting your medicines, non-medical meetings or appointments, work, or from getting things that you need?: No   Physical Activity: Sufficiently Active (2/14/2024)    Exercise Vital Sign     Days of Exercise per Week: 5 days     Minutes of Exercise per Session: 60 min   Stress: No Stress Concern Present  "(2/14/2024)    [employer] [employer] - Occupational Stress Questionnaire     Feeling of Stress : Not at all   Social Connections: Unknown (2/14/2024)    Social Connection and Isolation Panel [NHANES]     Frequency of Social Gatherings with Friends and Family: More than three times a week   Housing Stability: Low Risk  (2/14/2024)    Housing Stability     Do you have housing? : Yes     Are you worried about losing your housing?: No       Review of Systems:  Skin:  not assessed       Eyes:  Positive for glasses    ENT:  Negative   recently had doubel jaw surgery  Respiratory:  Negative       Cardiovascular:  Negative for;edema;lightheadedness;dizziness Positive for;chest pain;palpitations chest tightness. hx of heart murmur. works from home so doesn't get a ton of exercise.  Gastroenterology: Negative      Genitourinary:  Negative      Musculoskeletal:  Negative      Neurologic:  Negative      Psychiatric:  Positive for excessive stress    Heme/Lymph/Imm:  Positive for allergies    Endocrine:  Negative        Physical Exam:  Vitals: BP (!) 144/86   Pulse 61   Ht 1.85 m (6' 0.84\")   Wt 93.4 kg (206 lb)   BMI 27.30 kg/m      Constitutional:  cooperative, alert and oriented, well developed, well nourished, in no acute distress appears anxious      Skin:  warm and dry to the touch, no apparent skin lesions or masses noted          Head:  normocephalic, no masses or lesions        Eyes:  pupils equal and round, conjunctivae and lids unremarkable, sclera white, no xanthalasma, EOMS intact, no nystagmus        Lymph:      ENT:  no pallor or cyanosis, dentition good        Neck:  no carotid bruit        Respiratory:  normal breath sounds, clear to auscultation, normal A-P diameter, normal symmetry, normal respiratory excursion, no use of accessory muscles         Cardiac: regular rhythm;no murmurs, gallops or rubs detected                pulses full and equal                                        GI:   "         Extremities and Muscular Skeletal:  no edema;no spinal abnormalities noted;normal muscle strength and tone              Neurological:  no gross motor deficits        Psych:  affect appropriate, oriented to time, person and place        CC  Elda El PA-C  [address] [place] [address]

## 2024-05-21 NOTE — LETTER
5/21/2024    Elda El PA-C  [address] [place] [address]    RE: John Monteiro       Dear Colleague,     I had the pleasure of seeing John Monteiro in the [facility].  HPI and Plan:   Sarah is a 28-year-old gentleman with ADHD, recent surgery for maxillary hypoplasia, who is now referred for evaluation for hypertension.    EKG demonstrates normal sinus rhythm with increased voltage and repolarization changes.  Echocardiogram appears to be normal without significant valvular pathology, left ventricular hypertrophy or any significant valvular dysfunction.    Patient states he first became aware of his hypertension last summer.  Previously had never been checked.  He has been on a series of medications that can contribute to hypertension including Vyvanse, Adderall, gabapentin, ibuprofen.  He is now on off of these except for the Vyvanse.    To his knowledge high blood pressure does not run in his family.  Both parents are alive and well mother is 64 father is 72.  He is a lifelong non-smoker.  He does not drink alcohol.  Does not have diabetes mellitus.  Cholesterol status is unknown.    He states he does not follow any special diet.  He does not exercise.  He does get chest discomfort but this occurs most often just with lying down and not necessarily with exercise.  He was on lisinopril for a while for blood pressure but is no longer on that.  He states previously he did not sleep well at nighttime and this is one of the reasons for surgery.  He does think he is sleeping better now.    Review of the records shows a normal aldosterone renin ratio, normal creatinine and electrolytes his most recent CBC does show mild anemia with a hemoglobin of 11.5 but this may have been a postoperative hemoglobin.    Assessment and plan.  Sarah has no symptoms to suggest ischemia, heart failure or significant arrhythmia.  His chest discomfort is quite atypical and I suspect not coronary  artery disease.    He states his blood pressure at home waxes and wanes.  At times well-controlled at other times in the 140s.    Blood pressure is high today.  I have asked that he check his blood pressure at home on a regular basis.  I will set him up for a stress echo.  This will allow us to see what his rhythm, blood pressure does with exercise as well as rule out any underlying coronary artery disease.  As stated above I do not think his chest discomfort is ischemic in origin.    I will check a renal ultrasound because of his young age to have blood pressure.    We talked about the importance of a low-salt diet high in fresh fruits and vegetables and regular exercise as nonmedicinal ways to lower his blood pressure.    Further evaluation treatment will depend upon the above results.  Thank you for allow me to participate in this patient's care.  Sincerely,                               Evens Stafford MD [facility]    The longitudinal plan of care for the diagnosis(es)/condition(s) as documented were addressed during this visit. Due to the added complexity in care, I will continue to support Sarah in the subsequent management and with ongoing continuity of care.     Today's clinic visit entailed:  Review of the result(s) of each unique test - lab work, echocardiogram, EKG  Ordering of each unique test  Prescription drug management  45 minutes spent by me on the date of the encounter doing chart review, history and exam, documentation and further activities per the note  Provider  Link to [facility] Help Grid     The level of medical decision making during this visit was of moderate complexity.      Orders Placed This Encounter   Procedures    US Renal Complete w Doppler Complete    EKG 12-lead complete w/read - Clinics (performed today)    Exercise Stress Echocardiogram       No orders of the defined types were placed in this encounter.      Medications Discontinued During This Encounter   Medication Reason     acetaminophen (TYLENOL) 160 MG/5ML solution     benzocaine-menthol (CEPACOL) 15-3.6 MG lozenge     chlorhexidine (PERIDEX) 0.12 % solution     gabapentin (NEURONTIN) 250 MG/5ML solution     hydrocortisone (CORTAID) 1 % external cream     ibuprofen (ADVIL/MOTRIN) 100 MG/5ML suspension     lisinopril (ZESTRIL) 10 MG tablet     ondansetron (ZOFRAN ODT) 4 MG ODT tab     oxyCODONE (ROXICODONE) 5 MG/5ML solution     sodium chloride (OCEAN) 0.65 % nasal spray     amphetamine-dextroamphetamine (ADDERALL XR) 10 MG 24 hr capsule     cholecalciferol (VITAMIN D3) 25 mcg (1000 units) capsule     finasteride (PROPECIA) 1 MG tablet     loratadine-pseudoePHEDrine (CLARITIN-D 12-HOUR) 5-120 MG 12 hr tablet          Encounter Diagnoses   Name Primary?    Elevated blood pressure reading without diagnosis of hypertension     Benign essential hypertension Yes    Chest discomfort     Abnormal electrocardiogram        CURRENT MEDICATIONS:  Current Outpatient Medications   Medication Sig Dispense Refill    ketoconazole (NIZORAL) 2 % external shampoo Apply topically three times a week      lisdexamfetamine (VYVANSE) 10 MG capsule Take 2 capsules (20 mg) by mouth every morning 60 capsule 0       ALLERGIES     Allergies   Allergen Reactions    Seasonal Allergies      Ragweed and trees       PAST MEDICAL HISTORY:  Past Medical History:   Diagnosis Date    Abdominal pain 12/18/2012    ADHD     Ankle sprain 2019    Per patient report- R ankle roll when playing basketball in Spring 2019    Appendicitis 09/19/2009    Benign essential hypertension 5/21/2024    Distal radius fracture 10/23/2021    Elbow fracture 07/25/2018    Pneumonia 04/16/2009       PAST SURGICAL HISTORY:  Past Surgical History:   Procedure Laterality Date    APPENDECTOMY  09/19/2009    laparoscopic    COLONOSCOPY N/A 4/3/2024    Procedure: Colonoscopy;  Surgeon: Mario Vasquez MD;  Location:  GI    LE FORT ONE , OSTEOTOMY SAGITTAL SPLIT, COMBINED Bilateral 11/29/2023     Procedure: OSTEOTOMY, LE FORT I, WITH MANDIBULAR SAGITTAL SPLIT bilateral;  Surgeon: Betty Reyes DDS;  Location: [facility] OR    ORTHOPEDIC SURGERY  11/29/2023    Double jaw surgery for maxillary hypoplasia       FAMILY HISTORY:  Family History   Problem Relation Age of Onset    Lipids Mother     Family History Negative Father     Colon Cancer Other     Other Cancer Other         Stomach cancer       SOCIAL HISTORY:  Social History     Socioeconomic History    Marital status: Single     Spouse name: None    Number of children: None    Years of education: None    Highest education level: None   Occupational History    Occupation: Student    Occupation:    Tobacco Use    Smoking status: Never    Smokeless tobacco: Never   Vaping Use    Vaping status: Never Used   Substance and Sexual Activity    Alcohol use: No    Drug use: No    Sexual activity: Yes     Partners: Female     Birth control/protection: Condom   Other Topics Concern    Parent/sibling w/ CABG, MI or angioplasty before 65F 55M? No   Social History Narrative    2/2024        Lives with mom    Works as     Enjoys reading, basketball     Social Determinants of Health     Financial Resource Strain: Low Risk  (2/14/2024)    Financial Resource Strain     Within the past 12 months, have you or your family members you live with been unable to get utilities (heat, electricity) when it was really needed?: No   Food Insecurity: Low Risk  (2/14/2024)    Food Insecurity     Within the past 12 months, did you worry that your food would run out before you got money to buy more?: No     Within the past 12 months, did the food you bought just not last and you didn t have money to get more?: No   Transportation Needs: Low Risk  (2/14/2024)    Transportation Needs     Within the past 12 months, has lack of transportation kept you from medical appointments, getting your medicines, non-medical meetings or appointments, work, or from getting  "things that you need?: No   Physical Activity: Sufficiently Active (2/14/2024)    Exercise Vital Sign     Days of Exercise per Week: 5 days     Minutes of Exercise per Session: 60 min   Stress: No Stress Concern Present (2/14/2024)    [employer] [place] [employer] - Occupational Stress Questionnaire     Feeling of Stress : Not at all   Social Connections: Unknown (2/14/2024)    Social Connection and Isolation Panel [NHANES]     Frequency of Social Gatherings with Friends and Family: More than three times a week   Housing Stability: Low Risk  (2/14/2024)    Housing Stability     Do you have housing? : Yes     Are you worried about losing your housing?: No       Review of Systems:  Skin:  not assessed       Eyes:  Positive for glasses    ENT:  Negative   recently had doubel jaw surgery  Respiratory:  Negative       Cardiovascular:  Negative for;edema;lightheadedness;dizziness Positive for;chest pain;palpitations chest tightness. hx of heart murmur. works from home so doesn't get a ton of exercise.  Gastroenterology: Negative      Genitourinary:  Negative      Musculoskeletal:  Negative      Neurologic:  Negative      Psychiatric:  Positive for excessive stress    Heme/Lymph/Imm:  Positive for allergies    Endocrine:  Negative        Physical Exam:  Vitals: BP (!) 144/86   Pulse 61   Ht 1.85 m (6' 0.84\")   Wt 93.4 kg (206 lb)   BMI 27.30 kg/m      Constitutional:  cooperative, alert and oriented, well developed, well nourished, in no acute distress appears anxious      Skin:  warm and dry to the touch, no apparent skin lesions or masses noted          Head:  normocephalic, no masses or lesions        Eyes:  pupils equal and round, conjunctivae and lids unremarkable, sclera white, no xanthalasma, EOMS intact, no nystagmus        Lymph:      ENT:  no pallor or cyanosis, dentition good        Neck:  no carotid bruit        Respiratory:  normal breath sounds, clear to auscultation, normal A-P diameter, " normal symmetry, normal respiratory excursion, no use of accessory muscles         Cardiac: regular rhythm;no murmurs, gallops or rubs detected                pulses full and equal                                        GI:           Extremities and Muscular Skeletal:  no edema;no spinal abnormalities noted;normal muscle strength and tone              Neurological:  no gross motor deficits        Psych:  affect appropriate, oriented to time, person and place        CC  Elda El PA-C  [address] [place] [address]      Thank you for allowing me to participate in the care of your patient.      Sincerely,     Evens Stafford MD     [facility]

## 2024-05-25 ENCOUNTER — MYC REFILL (OUTPATIENT)
Dept: FAMILY MEDICINE | Facility: CLINIC | Age: 29
End: 2024-05-25
Payer: COMMERCIAL

## 2024-05-25 DIAGNOSIS — F90.2 ADHD (ATTENTION DEFICIT HYPERACTIVITY DISORDER), COMBINED TYPE: ICD-10-CM

## 2024-05-28 RX ORDER — LISDEXAMFETAMINE DIMESYLATE 10 MG/1
20 CAPSULE ORAL EVERY MORNING
Qty: 60 CAPSULE | Refills: 0 | Status: SHIPPED | OUTPATIENT
Start: 2024-05-28 | End: 2024-07-03

## 2024-07-22 ENCOUNTER — MYC REFILL (OUTPATIENT)
Dept: FAMILY MEDICINE | Facility: CLINIC | Age: 29
End: 2024-07-22
Payer: COMMERCIAL

## 2024-07-22 DIAGNOSIS — F90.2 ADHD (ATTENTION DEFICIT HYPERACTIVITY DISORDER), COMBINED TYPE: ICD-10-CM

## 2024-07-22 RX ORDER — LISDEXAMFETAMINE DIMESYLATE 10 MG/1
20 CAPSULE ORAL EVERY MORNING
Qty: 60 CAPSULE | Refills: 0 | Status: SHIPPED | OUTPATIENT
Start: 2024-07-22

## 2024-09-11 ENCOUNTER — OFFICE VISIT (OUTPATIENT)
Dept: FAMILY MEDICINE | Facility: CLINIC | Age: 29
End: 2024-09-11
Payer: COMMERCIAL

## 2024-09-11 VITALS
HEIGHT: 72 IN | SYSTOLIC BLOOD PRESSURE: 152 MMHG | TEMPERATURE: 97.7 F | HEART RATE: 69 BPM | WEIGHT: 218 LBS | BODY MASS INDEX: 29.53 KG/M2 | DIASTOLIC BLOOD PRESSURE: 91 MMHG | OXYGEN SATURATION: 99 % | RESPIRATION RATE: 18 BRPM

## 2024-09-11 DIAGNOSIS — F90.2 ADHD (ATTENTION DEFICIT HYPERACTIVITY DISORDER), COMBINED TYPE: ICD-10-CM

## 2024-09-11 DIAGNOSIS — R03.0 ELEVATED BLOOD PRESSURE READING WITHOUT DIAGNOSIS OF HYPERTENSION: Primary | ICD-10-CM

## 2024-09-11 LAB
AMPHETAMINES UR QL SCN: NORMAL
BARBITURATES UR QL SCN: NORMAL
BENZODIAZ UR QL SCN: NORMAL
BZE UR QL SCN: NORMAL
CANNABINOIDS UR QL SCN: NORMAL
FENTANYL UR QL: NORMAL
OPIATES UR QL SCN: NORMAL
PCP QUAL URINE (ROCHE): NORMAL

## 2024-09-11 PROCEDURE — 80307 DRUG TEST PRSMV CHEM ANLYZR: CPT | Performed by: PHYSICIAN ASSISTANT

## 2024-09-11 PROCEDURE — 99214 OFFICE O/P EST MOD 30 MIN: CPT | Performed by: PHYSICIAN ASSISTANT

## 2024-09-11 PROCEDURE — 80061 LIPID PANEL: CPT | Performed by: PHYSICIAN ASSISTANT

## 2024-09-11 PROCEDURE — 84403 ASSAY OF TOTAL TESTOSTERONE: CPT | Performed by: PHYSICIAN ASSISTANT

## 2024-09-11 PROCEDURE — 36415 COLL VENOUS BLD VENIPUNCTURE: CPT | Performed by: PHYSICIAN ASSISTANT

## 2024-09-11 RX ORDER — LISDEXAMFETAMINE DIMESYLATE 10 MG/1
10 CAPSULE ORAL DAILY
Qty: 30 CAPSULE | Refills: 0 | Status: SHIPPED | OUTPATIENT
Start: 2024-11-12 | End: 2024-12-12

## 2024-09-11 RX ORDER — LISDEXAMFETAMINE DIMESYLATE 10 MG/1
10 CAPSULE ORAL DAILY
Qty: 30 CAPSULE | Refills: 0 | Status: SHIPPED | OUTPATIENT
Start: 2024-10-12 | End: 2024-11-11

## 2024-09-11 RX ORDER — LISDEXAMFETAMINE DIMESYLATE 10 MG/1
10 CAPSULE ORAL DAILY
Qty: 30 CAPSULE | Refills: 0 | Status: SHIPPED | OUTPATIENT
Start: 2024-09-11 | End: 2024-10-11

## 2024-09-11 NOTE — PROGRESS NOTES
"  Assessment & Plan     Elevated blood pressure reading without diagnosis of hypertension  -followingwith cardiology  -lipids checked today  -recommend ongoing follow-up with cardiology  - Lipid Profile (Chol, Trig, HDL, LDL calc); Future  - Testosterone, total; Future  - Lipid Profile (Chol, Trig, HDL, LDL calc)  - Testosterone, total    ADHD (attention deficit hyperactivity disorder), combined type  -refills sent for 3 months  -UDS ordered  -plan to have 1-2 clinic visits per year for management of these meds.    - lisdexamfetamine (VYVANSE) 10 MG capsule; Take 1 capsule (10 mg) by mouth daily.  - lisdexamfetamine (VYVANSE) 10 MG capsule; Take 1 capsule (10 mg) by mouth daily.  - lisdexamfetamine (VYVANSE) 10 MG capsule; Take 1 capsule (10 mg) by mouth daily.  - Urine Drug Screen; Future    BMI  Estimated body mass index is 29.21 kg/m  as calculated from the following:    Height as of this encounter: 1.84 m (6' 0.44\").    Weight as of this encounter: 98.9 kg (218 lb).         Olive Smith is a 28 year old, presenting for the following health issues:  Recheck Medication (Pt is her for medication management and would like labs done today, pt last meal was 9/10/24 at 10pm.)      9/11/2024     3:04 PM   Additional Questions   Roomed by China   Accompanied by self     -has been on Vyvanse for the past year or so.  Very happy with this medication  -due for refills  -PDMp checked without concern  -due for UDS, ok with this today.  Admits to using marijuana over the weekend but only uses occasionally.  No other drugs    -was recently seen for elevated BP in cardiology clinic.  Is waiting to have stress echo.  Not on mediations  -denies any snoring  -no significant family hx of HTN    -would like testosterone checked.            History of Present Illness       Reason for visit:  Follow up appointment    He eats 2-3 servings of fruits and vegetables daily.He consumes 1 sweetened beverage(s) daily.He exercises with " "enough effort to increase his heart rate 10 to 19 minutes per day.  He exercises with enough effort to increase his heart rate 3 or less days per week.   He is taking medications regularly.         Objective    BP (!) 152/91 (BP Location: Right arm, Patient Position: Sitting, Cuff Size: Adult Regular)   Pulse 69   Temp 97.7  F (36.5  C) (Temporal)   Resp 18   Ht 1.84 m (6' 0.44\")   Wt 98.9 kg (218 lb)   SpO2 99%   BMI 29.21 kg/m    Body mass index is 29.21 kg/m .  Physical Exam   GENERAL: alert and no distress  NECK: no adenopathy, no asymmetry, masses, or scars  RESP: lungs clear to auscultation - no rales, rhonchi or wheezes  CV: regular rate and rhythm, normal S1 S2, no S3 or S4, no murmur, click or rub, no peripheral edema          Signed Electronically by: Elda El PA-C  Prior to immunization administration, verified patients identity using patient s name and date of birth. Please see Immunization Activity for additional information.     Screening Questionnaire for Adult Immunization    Are you sick today?   No   Do you have allergies to medications, food, a vaccine component or latex?   No   Have you ever had a serious reaction after receiving a vaccination?   No   Do you have a long-term health problem with heart, lung, kidney, or metabolic disease (e.g., diabetes), asthma, a blood disorder, no spleen, complement component deficiency, a cochlear implant, or a spinal fluid leak?  Are you on long-term aspirin therapy?   No   Do you have cancer, leukemia, HIV/AIDS, or any other immune system problem?   No   Do you have a parent, brother, or sister with an immune system problem?   No   In the past 3 months, have you taken medications that affect  your immune system, such as prednisone, other steroids, or anticancer drugs; drugs for the treatment of rheumatoid arthritis, Crohn s disease, or psoriasis; or have you had radiation treatments?   No   Have you had a seizure, or a brain or other " nervous system problem?   No   During the past year, have you received a transfusion of blood or blood    products, or been given immune (gamma) globulin or antiviral drug?   No   For women: Are you pregnant or is there a chance you could become       pregnant during the next month?   No   Have you received any vaccinations in the past 4 weeks?   No     Immunization questionnaire       Patient instructed to remain in clinic for 15 minutes afterwards, and to report any adverse reactions.     Screening performed by China Hinojosa MA on 9/11/2024 at 3:09 PM.

## 2024-09-12 ENCOUNTER — MYC REFILL (OUTPATIENT)
Dept: FAMILY MEDICINE | Facility: CLINIC | Age: 29
End: 2024-09-12
Payer: COMMERCIAL

## 2024-09-12 DIAGNOSIS — F90.2 ADHD (ATTENTION DEFICIT HYPERACTIVITY DISORDER), COMBINED TYPE: ICD-10-CM

## 2024-09-12 LAB
CHOLEST SERPL-MCNC: 227 MG/DL
FASTING STATUS PATIENT QL REPORTED: YES
HDLC SERPL-MCNC: 87 MG/DL
LDLC SERPL CALC-MCNC: 117 MG/DL
NONHDLC SERPL-MCNC: 140 MG/DL
TRIGL SERPL-MCNC: 113 MG/DL

## 2024-09-13 LAB — TESTOST SERPL-MCNC: 282 NG/DL (ref 240–950)

## 2024-09-13 RX ORDER — LISDEXAMFETAMINE DIMESYLATE 10 MG/1
10 CAPSULE ORAL DAILY
Qty: 30 CAPSULE | Refills: 0 | OUTPATIENT
Start: 2024-09-13

## 2024-09-30 ENCOUNTER — MYC MEDICAL ADVICE (OUTPATIENT)
Dept: FAMILY MEDICINE | Facility: CLINIC | Age: 29
End: 2024-09-30
Payer: COMMERCIAL

## 2024-09-30 DIAGNOSIS — F90.2 ADHD (ATTENTION DEFICIT HYPERACTIVITY DISORDER), COMBINED TYPE: Primary | ICD-10-CM

## 2024-09-30 NOTE — LETTER
October 8, 2024      John Monteiro  [address]  [facility] [address]      As a valued [facility] patient, your healthcare needs are our priority.    Your health care team has determined that you are due for an appointment to follow up on medication dosage this can be a virtual for phone visit .  We encourage you to call or schedule an appointment with your primary care provider to discuss your overdue screening and schedule an appointment.     If you already have had your screening performed at another health care facility, please ask that practice to send your results to [facility] [phone] and we will update your health records. This will ensure you receive the best possible care from our providers.      If you have any questions or need help with scheduling, please call the [facility] at [phone].        Yours in health,       Your care team at [facility]

## 2024-10-03 RX ORDER — LISDEXAMFETAMINE DIMESYLATE 30 MG/1
30 CAPSULE ORAL EVERY MORNING
Qty: 30 CAPSULE | Refills: 0 | Status: CANCELLED | OUTPATIENT
Start: 2024-10-03

## 2024-10-03 NOTE — TELEPHONE ENCOUNTER
Please schedule a virtual visit to discuss dose.  We just had a visit last month and he did not mention this so I'd like to discuss more.  Elda El PA-C on 10/3/2024 at 2:39 PM

## 2024-10-03 NOTE — TELEPHONE ENCOUNTER
"Patient taking Vyvanse 30mg daily in AM, requesting new order with 30mg dose.  \"I think is better dosage for me than the previous 20mg \"    Pended, sign if agree.    Natalia Asencio RN  [facility]      "

## 2024-10-08 NOTE — TELEPHONE ENCOUNTER
LMTCB #3. We have made several attempts to contact patient by phone to schedule an appointment. If patient returns call back, please help patient schedule an appointment per message below. Thanks! Unfortunately, our calls have not been returned and we were unable to schedule. At this time, we will no longer make an attempt to schedule this appointment. Completing task. Letter sent

## 2024-10-15 ENCOUNTER — VIRTUAL VISIT (OUTPATIENT)
Dept: FAMILY MEDICINE | Facility: CLINIC | Age: 29
End: 2024-10-15
Payer: COMMERCIAL

## 2024-10-15 DIAGNOSIS — F90.2 ADHD (ATTENTION DEFICIT HYPERACTIVITY DISORDER), COMBINED TYPE: Primary | ICD-10-CM

## 2024-10-15 PROCEDURE — 99214 OFFICE O/P EST MOD 30 MIN: CPT | Mod: 95 | Performed by: PHYSICIAN ASSISTANT

## 2024-10-15 RX ORDER — LISDEXAMFETAMINE DIMESYLATE 30 MG/1
30 CAPSULE ORAL DAILY
Qty: 30 CAPSULE | Refills: 0 | Status: SHIPPED | OUTPATIENT
Start: 2024-10-15 | End: 2024-11-14

## 2024-10-15 RX ORDER — LISDEXAMFETAMINE DIMESYLATE 30 MG/1
30 CAPSULE ORAL DAILY
Qty: 30 CAPSULE | Refills: 0 | Status: SHIPPED | OUTPATIENT
Start: 2024-11-14 | End: 2024-12-14

## 2024-10-15 RX ORDER — LISDEXAMFETAMINE DIMESYLATE 30 MG/1
30 CAPSULE ORAL DAILY
Qty: 30 CAPSULE | Refills: 0 | Status: SHIPPED | OUTPATIENT
Start: 2024-12-14 | End: 2025-01-13

## 2024-10-15 NOTE — PROGRESS NOTES
"Sarah is a 29 year old who is being evaluated via a billable video visit.    How would you like to obtain your AVS? MyChart  If the video visit is dropped, the invitation should be resent by: Text to cell phone: [phone]  Will anyone else be joining your video visit? No      Assessment & Plan     ADHD (attention deficit hyperactivity disorder), combined type  -will increase Vyvanse to 30mg to take in the morning  -Side effects of medication(s) discussed as well as risks/benefits of taking    -plan to follow-up in clinic in 3 months.  Will need CSA at that time.    - lisdexamfetamine (VYVANSE) 30 MG capsule; Take 1 capsule (30 mg) by mouth daily.  - lisdexamfetamine (VYVANSE) 30 MG capsule; Take 1 capsule (30 mg) by mouth daily.  - lisdexamfetamine (VYVANSE) 30 MG capsule; Take 1 capsule (30 mg) by mouth daily.          BMI  Estimated body mass index is 29.21 kg/m  as calculated from the following:    Height as of 9/11/24: 1.84 m (6' 0.44\").    Weight as of 9/11/24: 98.9 kg (218 lb).         Subjective   Sarah is a 29 year old, presenting for the following health issues:  No chief complaint on file.        9/11/2024     3:04 PM   Additional Questions   Roomed by China   Accompanied by self     HPI     -h/o ADHD, has been on Vyvanse 20mg but thinks that he needs a higher dose.  Does not feel he is able to focus as well as he needs to for work on the 20mg dose  -no sife effects noted    -h/o borderline Bps and is following with cardiology on this.    -Patient denies any exertional chest pain, dyspnea, palpitations, syncope, orthopnea, edema or paroxysmal nocturnal dyspnea.             Objective           Vitals:  No vitals were obtained today due to virtual visit.    Physical Exam   GENERAL: alert and no distress  EYES: Eyes grossly normal to inspection.  No discharge or erythema, or obvious scleral/conjunctival abnormalities.  RESP: No audible wheeze, cough, or visible cyanosis.    SKIN: Visible skin clear. No " significant rash, abnormal pigmentation or lesions.  NEURO: Cranial nerves grossly intact.  Mentation and speech appropriate for age.  PSYCH: Appropriate affect, tone, and pace of words        Video-Visit Details    Type of service:  Video Visit   Originating Location (pt. Location): Home    Distant Location (provider location):  On-site  Platform used for Video Visit: Alberto  Signed Electronically by: Elda El PA-C

## 2024-10-16 RX ORDER — LISDEXAMFETAMINE DIMESYLATE 20 MG/1
20 CAPSULE ORAL DAILY
Qty: 30 CAPSULE | Refills: 0 | Status: SHIPPED | OUTPATIENT
Start: 2024-12-15 | End: 2025-01-14

## 2024-10-16 RX ORDER — LISDEXAMFETAMINE DIMESYLATE 10 MG/1
10 CAPSULE ORAL DAILY
Qty: 30 CAPSULE | Refills: 0 | Status: SHIPPED | OUTPATIENT
Start: 2024-12-17 | End: 2025-01-16

## 2024-10-16 RX ORDER — LISDEXAMFETAMINE DIMESYLATE 10 MG/1
10 CAPSULE ORAL DAILY
Qty: 30 CAPSULE | Refills: 0 | Status: SHIPPED | OUTPATIENT
Start: 2024-10-16 | End: 2024-11-15

## 2024-10-16 RX ORDER — LISDEXAMFETAMINE DIMESYLATE 20 MG/1
20 CAPSULE ORAL DAILY
Qty: 30 CAPSULE | Refills: 0 | Status: SHIPPED | OUTPATIENT
Start: 2024-10-16 | End: 2024-11-15

## 2024-10-16 RX ORDER — LISDEXAMFETAMINE DIMESYLATE 20 MG/1
20 CAPSULE ORAL DAILY
Qty: 30 CAPSULE | Refills: 0 | Status: SHIPPED | OUTPATIENT
Start: 2024-11-15 | End: 2024-12-15

## 2024-10-16 RX ORDER — LISDEXAMFETAMINE DIMESYLATE 10 MG/1
10 CAPSULE ORAL DAILY
Qty: 30 CAPSULE | Refills: 0 | Status: SHIPPED | OUTPATIENT
Start: 2024-11-16 | End: 2024-12-16

## 2024-10-16 NOTE — TELEPHONE ENCOUNTER
Elda,    Please review patient's MyChart message and advise.    Gabriel OLIVA RN  [facility]

## 2024-10-22 ENCOUNTER — HOSPITAL ENCOUNTER (OUTPATIENT)
Dept: CARDIOLOGY | Facility: CLINIC | Age: 29
Discharge: HOME | End: 2024-10-22
Attending: INTERNAL MEDICINE
Payer: COMMERCIAL

## 2024-10-22 ENCOUNTER — HOSPITAL ENCOUNTER (OUTPATIENT)
Dept: ULTRASOUND IMAGING | Facility: CLINIC | Age: 29
Discharge: HOME | End: 2024-10-22
Attending: INTERNAL MEDICINE
Payer: COMMERCIAL

## 2024-10-22 DIAGNOSIS — I10 BENIGN ESSENTIAL HYPERTENSION: ICD-10-CM

## 2024-10-22 DIAGNOSIS — R94.31 ABNORMAL ELECTROCARDIOGRAM: ICD-10-CM

## 2024-10-22 DIAGNOSIS — R07.89 CHEST DISCOMFORT: ICD-10-CM

## 2024-10-22 PROCEDURE — 93016 CV STRESS TEST SUPVJ ONLY: CPT | Performed by: INTERNAL MEDICINE

## 2024-10-22 PROCEDURE — 255N000002 HC RX 255 OP 636: Performed by: INTERNAL MEDICINE

## 2024-10-22 PROCEDURE — 76770 US EXAM ABDO BACK WALL COMP: CPT

## 2024-10-22 PROCEDURE — 93325 DOPPLER ECHO COLOR FLOW MAPG: CPT | Mod: TC

## 2024-10-22 PROCEDURE — 93325 DOPPLER ECHO COLOR FLOW MAPG: CPT | Mod: 26 | Performed by: INTERNAL MEDICINE

## 2024-10-22 PROCEDURE — 93975 VASCULAR STUDY: CPT | Mod: 26 | Performed by: INTERNAL MEDICINE

## 2024-10-22 PROCEDURE — 93321 DOPPLER ECHO F-UP/LMTD STD: CPT | Mod: 26 | Performed by: INTERNAL MEDICINE

## 2024-10-22 PROCEDURE — 93018 CV STRESS TEST I&R ONLY: CPT | Performed by: INTERNAL MEDICINE

## 2024-10-22 PROCEDURE — 76770 US EXAM ABDO BACK WALL COMP: CPT | Mod: 26 | Performed by: INTERNAL MEDICINE

## 2024-10-22 PROCEDURE — 93321 DOPPLER ECHO F-UP/LMTD STD: CPT | Mod: TC

## 2024-10-22 PROCEDURE — 93350 STRESS TTE ONLY: CPT | Mod: 26 | Performed by: INTERNAL MEDICINE

## 2024-10-22 RX ADMIN — HUMAN ALBUMIN MICROSPHERES AND PERFLUTREN 9 ML: 10; .22 INJECTION, SOLUTION INTRAVENOUS at 14:47

## 2024-10-23 ENCOUNTER — TELEPHONE (OUTPATIENT)
Dept: CARDIOLOGY | Facility: CLINIC | Age: 29
End: 2024-10-23
Payer: COMMERCIAL

## 2024-10-23 DIAGNOSIS — I10 BENIGN ESSENTIAL HYPERTENSION: Primary | ICD-10-CM

## 2024-10-23 RX ORDER — AMLODIPINE BESYLATE 5 MG/1
5 TABLET ORAL DAILY
Qty: 90 TABLET | Refills: 3 | Status: SHIPPED | OUTPATIENT
Start: 2024-10-23

## 2024-10-23 NOTE — TELEPHONE ENCOUNTER
----- Message from Evens Stfaford sent at 10/22/2024  5:36 PM CDT -----  Stress test looks good.  However blood pressure is high.  Lets start amlodipine 5 mg daily.  Aldosterone renin level is normal.  Renal artery ultrasound still pending      Spoke to patient, reviewed results and recommendations as above. He was agreeable to plan. Rx sent to pharmacy. Recommended he send a [employer] message in 1-2 weeks with an update on his BP and he will do this.   
none

## 2024-10-30 ENCOUNTER — TELEPHONE (OUTPATIENT)
Dept: CARDIOLOGY | Facility: CLINIC | Age: 29
End: 2024-10-30
Payer: COMMERCIAL

## 2024-10-30 NOTE — TELEPHONE ENCOUNTER
Left patient a message regarding normal results for US renal. Asked patient to call back with BP readings after starting amlodipine.    Left message for patient to call back to Team 2 R.N.s @ [phone]

## 2024-10-30 NOTE — TELEPHONE ENCOUNTER
----- Message from Evens Stafford sent at 10/29/2024  5:12 PM CDT -----  Blood pressure is probably secondary to his meds.  No evidence of renal artery stenosis.  Start amlodipine 5 mg daily.  Have him come in for a blood pressure check with the nurse in 2 weeks.    Amlodipine 5mg was already initiated 10/23/24 with plans to send a mychart update re: BP in 1-2 weeks. Called patient to review results/get BP update, left a message to call back.

## 2024-10-31 ENCOUNTER — TELEPHONE (OUTPATIENT)
Dept: CARDIOLOGY | Facility: CLINIC | Age: 29
End: 2024-10-31
Payer: COMMERCIAL

## 2024-10-31 NOTE — TELEPHONE ENCOUNTER
Spoke to patient, reviewed results. Regarding BP, says he has been on the amlodipine for about 4 days now. He takes his amlodipine at bedtime (1am) and when he checks his BP immediately prior and then again in the morning. His readings have been consistently around 145/90. Recommended he move amlodipine to AM and check BP 1-2 hours later. Recommended he call back in 1 week with an update on his readings, may need to give the medication more time to work. He was agreeable to plan.

## 2024-10-31 NOTE — TELEPHONE ENCOUNTER
Second attempt at reaching patient. VM left asking to please return call to team nurse line or send mychart with updated BP readings.

## 2024-10-31 NOTE — TELEPHONE ENCOUNTER
[facility] Call Center    Phone Message    May a detailed message be left on voicemail: yes    Reason for Call: Patient returning call to discuss echo stress test results. Please call back to further coordinate.    Thank you!  [facility]

## 2024-11-07 ENCOUNTER — MYC MEDICAL ADVICE (OUTPATIENT)
Dept: CARDIOLOGY | Facility: CLINIC | Age: 29
End: 2024-11-07
Payer: COMMERCIAL

## 2024-11-07 DIAGNOSIS — I10 BENIGN ESSENTIAL HYPERTENSION: Primary | ICD-10-CM

## 2024-11-07 NOTE — TELEPHONE ENCOUNTER
[employer] message sent to patient for BP update after moving amlodipine to AM.

## 2024-11-12 NOTE — TELEPHONE ENCOUNTER
Reply from patient:  Sarah Monteiro  [facility] Heart Team 2  Phone Number: [phone]     Hi,    I switched to the morning but my blood pressure readings haven t changed. I m usually measuring around 145/90  =======================    Amlodipine 5mg daily was started on 10/23/2024  Stress echo was negative  US renal negative    Will message Dr. Stafford to review

## 2024-11-13 RX ORDER — SPIRONOLACTONE 25 MG/1
25 TABLET ORAL DAILY
Qty: 90 TABLET | Refills: 3 | Status: SHIPPED | OUTPATIENT
Start: 2024-11-13

## 2024-11-13 NOTE — TELEPHONE ENCOUNTER
My chart message from patient:  Sarah Monteiro [facility] Heart Team 2  Phone Number: [phone]     Hi I m currently in [facility]. Would it be possible to send the prescription to [facility] at the address below.    [address], [place] [address]    Also should I stop taking the Amlodipine then?  ===========================  Hello, Mr. Monteiro,    We are advised to avoid sending prescriptions out of state. However, your [facility] [place] should be able to contact the local [facility] and have the script sent down there.     This medication is in addition to the amlodipine - continue that medication also.    Please continue to monitor your BP at home. If you are not back in [place] in 2 weeks, we need to discuss how to have your lab work completed.    Thank you  Team 2 Sena  [phone]

## 2024-11-13 NOTE — TELEPHONE ENCOUNTER
Reply from Dr. Stafford:  Evens Stafford MD Anderson, Ann-Marie AGUILAR, RN  Phone Number: [phone]     It is probably the Vynase that is causing his hypertension.  Lets start spironolactone 25 mg daily check a BMP in 2 weeks.  Follow-up on BP at that time.      0940 attempted to contact patient to review Dr. Stafford's recommendations. Left a detailed message and sent a my chart as follow up. Patient to update us that he received the plan.    Matty, Mr. Monteiro,    Thank you for sending the blood pressure update. Dr. Stafford recommends adding a medication for blood pressure and then checking labs in 2 weeks.    1 start spironolactone 25mg daily. We sent the script to your [facility] pharmacy on [address]    2 check a bmp lab in 2 weeks. You can call our scheduling at [phone] to set up a lab appointment. You may also choose a [facility] clinic closer to home and call there for an appointment. Either choice will send the results to Dr. Stafford    3 please continue to monitor your blood pressure. We will need an update the same day as your labs are drawn    4 please reply back to this message so we know you are starting the medication.    5 call if you have any questions    Thank you  Team 2 R.N.s  [phone]

## 2024-11-29 ENCOUNTER — TELEPHONE (OUTPATIENT)
Dept: CARDIOLOGY | Facility: CLINIC | Age: 29
End: 2024-11-29
Payer: COMMERCIAL

## 2024-11-29 NOTE — TELEPHONE ENCOUNTER
Contacted patient to see if he started the spironolactone and to schedule bmp 2 weeks after start. Patient has been out of state in [place].    My chart to patient    Matty, Mr. Monteiro,    We were in touch mid-November to add a medication (spironolactone) for your blood pressure control. Were you able to move the script to [place] and get started?    Are you back in [place]?    If so we need to have you set up a lab to check your electrolytes and renal function (a bmp). Please call scheduling at [phone] to find a lab appointment.    If you have not started this yet, please send an update and we will adjust the timing.     Thank you  Team 2 R.N.s  [phone]

## 2024-12-04 NOTE — TELEPHONE ENCOUNTER
[employer] message not read. Attempted to reach patient, left a detailed voicemail regarding spironolactone and follow up labs. Team 2 number provided to call back with any questions, otherwise scheduling phone number provided to set up labs.

## 2024-12-10 ENCOUNTER — TELEPHONE (OUTPATIENT)
Dept: CARDIOLOGY | Facility: CLINIC | Age: 29
End: 2024-12-10

## 2024-12-10 DIAGNOSIS — I10 BENIGN ESSENTIAL HYPERTENSION: ICD-10-CM

## 2024-12-10 RX ORDER — AMLODIPINE BESYLATE 5 MG/1
5 TABLET ORAL DAILY
Qty: 7 TABLET | Refills: 1 | Status: SHIPPED | OUTPATIENT
Start: 2024-12-10

## 2024-12-10 RX ORDER — SPIRONOLACTONE 25 MG/1
25 TABLET ORAL DAILY
Qty: 7 TABLET | Refills: 1 | Status: SHIPPED | OUTPATIENT
Start: 2024-12-10

## 2024-12-10 NOTE — TELEPHONE ENCOUNTER
Spoke with patient, he states he left his meds in [place]. His friends will either bring the meds or ship them up. He needs a bridging fill for at least 7 days.     Rx escripted for spironolactone and amlodipine for 7 days. Reviewed with patient that he may have to pay out of pocket. Asked to patient to call if he needs a new script sent after he finishes the [place] supply - this bridging script may cancel his current account.  Patient verbalized understanding and agreed with plan.

## 2024-12-10 NOTE — TELEPHONE ENCOUNTER
[facility] Call Center    Phone Message    May a detailed message be left on voicemail: yes     Reason for Call: Medication Question or concern regarding medication   Prescription Clarification  Name of Medication: amLODIPine (NORVASC) 5 MG tablet  spironolactone (ALDACTONE) 25 MG tablet  Prescribing Provider: Dr. Zacarias   Pharmacy:    [id number] [place], [place] - [address] AT [facility] [address]   What on the order needs clarification? Patient states he left both of these medications at a friends house out of state and is looking for an auth to be sent to the pharmacy ASAP.       Action Taken: Other: cardiology    Travel Screening: Not Applicable  Thank you!  [facility]       Date of Service:

## 2025-01-13 ENCOUNTER — MYC MEDICAL ADVICE (OUTPATIENT)
Dept: CARDIOLOGY | Facility: CLINIC | Age: 30
End: 2025-01-13

## 2025-01-13 NOTE — LETTER
January 21, 2025       TO: John Monteiro  [address]  [place] [facility] [address]       Dear John Monteiro,    Our records indicate that you are due for follow up blood work after starting spironolactone in December. We need to make sure that your kidney function/electrolytes are stable since starting this mediation.     Please call [phone] to schedule your appointment.    Thank you for allowing [facility] to be a part of your health care team and we look forward to seeing you soon.    Thank you,    [facility]

## 2025-01-21 NOTE — TELEPHONE ENCOUNTER
[employer] message not read by patient. Called and left a reminder message to schedule labs. Letter also sent.

## 2025-01-27 ENCOUNTER — MYC REFILL (OUTPATIENT)
Dept: FAMILY MEDICINE | Facility: CLINIC | Age: 30
End: 2025-01-27
Payer: COMMERCIAL

## 2025-01-27 DIAGNOSIS — F90.2 ADHD (ATTENTION DEFICIT HYPERACTIVITY DISORDER), COMBINED TYPE: ICD-10-CM

## 2025-01-28 RX ORDER — LISDEXAMFETAMINE DIMESYLATE 10 MG/1
10 CAPSULE ORAL DAILY
Qty: 30 CAPSULE | Refills: 0 | Status: SHIPPED | OUTPATIENT
Start: 2025-01-28

## 2025-01-28 RX ORDER — LISDEXAMFETAMINE DIMESYLATE 20 MG/1
20 CAPSULE ORAL DAILY
Qty: 30 CAPSULE | Refills: 0 | Status: SHIPPED | OUTPATIENT
Start: 2025-01-28

## 2025-02-04 ENCOUNTER — MYC REFILL (OUTPATIENT)
Dept: FAMILY MEDICINE | Facility: CLINIC | Age: 30
End: 2025-02-04
Payer: COMMERCIAL

## 2025-02-04 DIAGNOSIS — F90.2 ADHD (ATTENTION DEFICIT HYPERACTIVITY DISORDER), COMBINED TYPE: ICD-10-CM

## 2025-02-05 RX ORDER — LISDEXAMFETAMINE DIMESYLATE 20 MG/1
20 CAPSULE ORAL DAILY
Qty: 30 CAPSULE | Refills: 0 | Status: SHIPPED | OUTPATIENT
Start: 2025-02-05

## 2025-02-05 NOTE — TELEPHONE ENCOUNTER
Reviewed PDMP.  Filled 10mg tabs on 1/28/25, has not filled 20mg tabs since mid October.  Sent to alternative pharmacy per pt request.  Elda El PA-C on 2/5/2025 at 11:28 AM     PDMP Review         Value Time User    State PDMP site checked  Yes 2/5/2025 11:28 AM Elda El PA-C

## 2025-03-03 ENCOUNTER — MYC REFILL (OUTPATIENT)
Dept: FAMILY MEDICINE | Facility: CLINIC | Age: 30
End: 2025-03-03
Payer: COMMERCIAL

## 2025-03-03 DIAGNOSIS — F90.2 ADHD (ATTENTION DEFICIT HYPERACTIVITY DISORDER), COMBINED TYPE: ICD-10-CM

## 2025-03-04 RX ORDER — LISDEXAMFETAMINE DIMESYLATE 20 MG/1
20 CAPSULE ORAL DAILY
Qty: 30 CAPSULE | Refills: 0 | Status: SHIPPED | OUTPATIENT
Start: 2025-04-03 | End: 2025-05-03

## 2025-03-04 RX ORDER — LISDEXAMFETAMINE DIMESYLATE 10 MG/1
10 CAPSULE ORAL DAILY
Qty: 30 CAPSULE | Refills: 0 | Status: SHIPPED | OUTPATIENT
Start: 2025-03-04 | End: 2025-04-03

## 2025-03-04 RX ORDER — LISDEXAMFETAMINE DIMESYLATE 10 MG/1
10 CAPSULE ORAL DAILY
Qty: 30 CAPSULE | Refills: 0 | Status: SHIPPED | OUTPATIENT
Start: 2025-05-05 | End: 2025-06-04

## 2025-03-04 RX ORDER — LISDEXAMFETAMINE DIMESYLATE 20 MG/1
20 CAPSULE ORAL DAILY
Qty: 30 CAPSULE | Refills: 0 | Status: SHIPPED | OUTPATIENT
Start: 2025-03-04 | End: 2025-04-03

## 2025-03-04 RX ORDER — LISDEXAMFETAMINE DIMESYLATE 20 MG/1
20 CAPSULE ORAL DAILY
Qty: 30 CAPSULE | Refills: 0 | OUTPATIENT
Start: 2025-03-04

## 2025-03-04 RX ORDER — LISDEXAMFETAMINE DIMESYLATE 20 MG/1
20 CAPSULE ORAL DAILY
Qty: 30 CAPSULE | Refills: 0 | Status: SHIPPED | OUTPATIENT
Start: 2025-05-03 | End: 2025-06-02

## 2025-03-04 RX ORDER — LISDEXAMFETAMINE DIMESYLATE 10 MG/1
10 CAPSULE ORAL DAILY
Qty: 30 CAPSULE | Refills: 0 | Status: SHIPPED | OUTPATIENT
Start: 2025-04-04 | End: 2025-05-04

## 2025-03-04 NOTE — TELEPHONE ENCOUNTER
3 months refills sent.    PDMP Review         Value Time User    State PDMP site checked  Yes 3/4/2025  9:45 AM Elda El PA-C Jessica Faris Jandric, PA-C on 3/4/2025 at 9:46 AM

## 2025-03-13 ENCOUNTER — MYC REFILL (OUTPATIENT)
Dept: FAMILY MEDICINE | Facility: CLINIC | Age: 30
End: 2025-03-13
Payer: COMMERCIAL

## 2025-03-13 DIAGNOSIS — F90.2 ADHD (ATTENTION DEFICIT HYPERACTIVITY DISORDER), COMBINED TYPE: ICD-10-CM

## 2025-03-15 RX ORDER — LISDEXAMFETAMINE DIMESYLATE 20 MG/1
20 CAPSULE ORAL DAILY
Qty: 30 CAPSULE | Refills: 0 | OUTPATIENT
Start: 2025-03-15

## 2025-03-15 NOTE — TELEPHONE ENCOUNTER
PDMP reviewed and prescription was filled for #30 on 3/4/2025.    Denied current request.     Elda El PA-C on 3/15/2025 at 11:06 AM

## 2025-03-17 ENCOUNTER — MYC REFILL (OUTPATIENT)
Dept: FAMILY MEDICINE | Facility: CLINIC | Age: 30
End: 2025-03-17
Payer: COMMERCIAL

## 2025-03-17 DIAGNOSIS — F90.2 ADHD (ATTENTION DEFICIT HYPERACTIVITY DISORDER), COMBINED TYPE: ICD-10-CM

## 2025-03-18 RX ORDER — LISDEXAMFETAMINE DIMESYLATE 20 MG/1
20 CAPSULE ORAL DAILY
Qty: 30 CAPSULE | Refills: 0 | Status: SHIPPED | OUTPATIENT
Start: 2025-03-18

## 2025-03-18 NOTE — TELEPHONE ENCOUNTER
Reviewed PDMP again, he filled the 10mg capsules on 3/4 but not the 20mg.  Approved this request.  Elda El PA-C on 3/18/2025 at 8:53 AM

## 2025-03-29 ENCOUNTER — HEALTH MAINTENANCE LETTER (OUTPATIENT)
Age: 30
End: 2025-03-29

## 2025-06-03 ENCOUNTER — MYC REFILL (OUTPATIENT)
Dept: FAMILY MEDICINE | Facility: CLINIC | Age: 30
End: 2025-06-03
Payer: COMMERCIAL

## 2025-06-03 DIAGNOSIS — F90.2 ADHD (ATTENTION DEFICIT HYPERACTIVITY DISORDER), COMBINED TYPE: ICD-10-CM

## 2025-06-05 RX ORDER — LISDEXAMFETAMINE DIMESYLATE 20 MG/1
20 CAPSULE ORAL DAILY
Qty: 30 CAPSULE | Refills: 0 | Status: SHIPPED | OUTPATIENT
Start: 2025-06-05

## 2025-06-10 ENCOUNTER — VIRTUAL VISIT (OUTPATIENT)
Dept: FAMILY MEDICINE | Facility: CLINIC | Age: 30
End: 2025-06-10
Payer: COMMERCIAL

## 2025-06-10 DIAGNOSIS — F90.2 ADHD (ATTENTION DEFICIT HYPERACTIVITY DISORDER), COMBINED TYPE: Primary | ICD-10-CM

## 2025-06-10 PROCEDURE — 98005 SYNCH AUDIO-VIDEO EST LOW 20: CPT | Performed by: PHYSICIAN ASSISTANT

## 2025-06-10 RX ORDER — LISDEXAMFETAMINE DIMESYLATE 20 MG/1
20 CAPSULE ORAL DAILY
Qty: 30 CAPSULE | Refills: 0 | Status: SHIPPED | OUTPATIENT
Start: 2025-06-27 | End: 2025-07-27

## 2025-06-10 RX ORDER — LISDEXAMFETAMINE DIMESYLATE 20 MG/1
20 CAPSULE ORAL DAILY
Qty: 30 CAPSULE | Refills: 0 | Status: SHIPPED | OUTPATIENT
Start: 2025-07-22 | End: 2025-08-21

## 2025-06-10 RX ORDER — LISDEXAMFETAMINE DIMESYLATE 10 MG/1
10 CAPSULE ORAL EVERY MORNING
Qty: 30 CAPSULE | Refills: 0 | Status: SHIPPED | OUTPATIENT
Start: 2025-08-22

## 2025-06-10 RX ORDER — LISDEXAMFETAMINE DIMESYLATE 20 MG/1
20 CAPSULE ORAL DAILY
Qty: 30 CAPSULE | Refills: 0 | Status: SHIPPED | OUTPATIENT
Start: 2025-08-22 | End: 2025-09-21

## 2025-06-10 RX ORDER — LISDEXAMFETAMINE DIMESYLATE 10 MG/1
10 CAPSULE ORAL EVERY MORNING
Qty: 30 CAPSULE | Refills: 0 | Status: SHIPPED | OUTPATIENT
Start: 2025-07-22

## 2025-06-10 RX ORDER — LISDEXAMFETAMINE DIMESYLATE 10 MG/1
10 CAPSULE ORAL EVERY MORNING
Qty: 30 CAPSULE | Refills: 0 | Status: SHIPPED | OUTPATIENT
Start: 2025-06-24

## 2025-06-10 NOTE — PROGRESS NOTES
"Sarah is a 29 year old who is being evaluated via a billable video visit.          Assessment & Plan     ADHD (attention deficit hyperactivity disorder), combined type  -refills sent for 3 months, discussed using a mail order pharmacy due to supply issues  -CSA sent via [employer], will completed UDS at follow-up visit in September  - lisdexamfetamine (VYVANSE) 20 MG capsule; Take 1 capsule (20 mg) by mouth daily.  - lisdexamfetamine (VYVANSE) 20 MG capsule; Take 1 capsule (20 mg) by mouth daily.  - lisdexamfetamine (VYVANSE) 20 MG capsule; Take 1 capsule (20 mg) by mouth daily.  - lisdexamfetamine (VYVANSE) 10 MG capsule; Take 1 capsule (10 mg) by mouth every morning.  - lisdexamfetamine (VYVANSE) 10 MG capsule; Take 1 capsule (10 mg) by mouth every morning.  - lisdexamfetamine (VYVANSE) 10 MG capsule; Take 1 capsule (10 mg) by mouth every morning.          BMI  Estimated body mass index is 29.21 kg/m  as calculated from the following:    Height as of 9/11/24: 1.84 m (6' 0.44\").    Weight as of 9/11/24: 98.9 kg (218 lb).             Subjective   Sarah is a 29 year old, presenting for the following health issues:  No chief complaint on file.    HPI      -doing well on Vyvanse.  Uses 20mg AM and 10mg afternoon  -no side effects  -has some trouble with access due to supply issues.        Objective           Vitals:  No vitals were obtained today due to virtual visit.    Physical Exam   GENERAL: alert and no distress  EYES: Eyes grossly normal to inspection.  No discharge or erythema, or obvious scleral/conjunctival abnormalities.  RESP: No audible wheeze, cough, or visible cyanosis.    SKIN: Visible skin clear. No significant rash, abnormal pigmentation or lesions.  NEURO: Cranial nerves grossly intact.  Mentation and speech appropriate for age.  PSYCH: Appropriate affect, tone, and pace of words        Video-Visit Details    Type of service:  Video Visit   Originating Location (pt. Location): home    Distant Location " (provider location):  On-site  Platform used for Video Visit: Alberto  Signed Electronically by: Elda El PA-C

## 2025-06-10 NOTE — LETTER
[facility]  06/10/25  Patient: John Monteiro  YOB: 1995  Medical Record Number: [id number]                                                                                  Non-Opioid Controlled Substance Agreement    This is an agreement between you and your provider regarding safe and appropriate use of controlled substances prescribed by your care team. Controlled substances are?medicines that can cause physical and mental dependence (abuse).     There are strict laws about having and using these medicines. We here at [facility] are  committed to working with you in your efforts to get better. To support you in this work, we'll help you schedule regular office appointments for medicine refills. If we must cancel or change your appointment for any reason, we'll make sure you have enough medicine to last until your next appointment.     As a Provider, I will:   Listen carefully to your concerns while treating you with respect.   Recommend a treatment plan that I believe is in your best interest and may involve therapies other than medicine.    Talk with you often about the possible benefits and the risk of harm of any medicine that we prescribe for you.  Assess the safety of this medicine and check how well it works.    Provide a plan on how to taper (discontinue or go off) using this medicine if the decision is made to stop its use.      ::  As a Patient, I understand controlled substances:     Are prescribed by my care provider to help me function or work and manage my condition(s).?  Are strong medicines and can cause serious side effects.     Need to be taken exactly as prescribed.?Combining controlled substances with certain medicines or chemicals (such as illegal drugs, alcohol, sedatives, sleeping pills, and benzodiazepines) can be dangerous or even fatal.? If I stop taking my medicines suddenly, I may have severe withdrawal symptoms.     The risks, benefits,  and side effects of these medicine(s) were explained to me. I agree that:    I will take part in other treatments as advised by my care team. This may be psychiatry or counseling, physical therapy, behavioral therapy, group treatment or a referral to specialist.    I will keep all my appointments and understand this is part of the monitoring of controlled substances.?My care team may require an office visit for EVERY controlled substance refill. If I miss appointments or don t follow instructions, my care team may stop my medicine    I will take my medicines as prescribed. I will not change the dose or schedule unless my care team tells me to. There will be no refills if I run out early.      I may be asked to come to the clinic and complete a urine drug test or complete a pill count. If I don t give a urine sample or participate in a pill count, the care team may stop my medicine.    I will only receive controlled substance prescriptions from this clinic. If I am treated by another provider, I will tell them that I am taking controlled substances and that I have a treatment agreement with this provider. I will inform my [facility] care team within one business day if I am given a prescription for any controlled substance by another healthcare provider. My [facility] care team can contact other providers and pharmacists about my use of any medicines.    It is up to me to make sure that I don't run out of my medicines on weekends or holidays.?If my care team is willing to refill my prescription without a visit, I must request refills only during office hours. Refills may take up to 3 business days to process. I will use one pharmacy to fill all my controlled substance prescriptions. I will notify the clinic about any changes to my insurance or medicine availability.    I am responsible for my prescriptions. If the medicine/prescription is lost, stolen or destroyed, it will not be replaced.?I also agree  not to share controlled substance medicines with anyone.     I am aware I should not use any illegal or recreational drugs. I agree not to drink alcohol unless my care team says I can.     If I enroll in the [place] Medical Cannabis program, I will tell my care team before my next refill.    I will tell my care team right away if I become pregnant, have a new medical problem treated outside of my regular clinic, or have a change in my medicines.     I understand that this medicine can affect my thinking, judgment and reaction time.? Alcohol and drugs affect the brain and body, which can affect the safety of my driving. Being under the influence of alcohol or drugs can affect my decision-making, behaviors, personal safety and the safety of others. Driving while impaired (DWI) can occur if a person is driving, operating or in physical control of a car, motorcycle, boat, snowmobile, ATV, motorbike, off-road vehicle or any other motor vehicle ([place] Statute 169A.20). I understand the risk if I choose to drive or operate any vehicle or machinery.    I understand that if I do not follow any of the conditions above, my prescriptions or treatment may be stopped or changed.   I agree that my provider, clinic care team and pharmacy may work with any city, state or federal law enforcement agency that investigates the misuse, sale or other diversion of my controlled medicine. I will allow my provider to discuss my care with, or share a copy of, this agreement with any other treating provider, pharmacy or emergency room where I receive care.     I have read this agreement and have asked questions about anything I did not understand.    ________________________________________________________  Patient Signature - John Monteiro     ___________________                   Date     ________________________________________________________  Provider Signature - Elda El PA-C       ___________________                    Date     ________________________________________________________  Witness Signature (required if provider not present while patient signing)          ___________________                   Date

## 2025-07-03 ENCOUNTER — MYC REFILL (OUTPATIENT)
Dept: FAMILY MEDICINE | Facility: CLINIC | Age: 30
End: 2025-07-03
Payer: COMMERCIAL

## 2025-07-03 DIAGNOSIS — F90.2 ADHD (ATTENTION DEFICIT HYPERACTIVITY DISORDER), COMBINED TYPE: ICD-10-CM

## 2025-07-03 RX ORDER — LISDEXAMFETAMINE DIMESYLATE 10 MG/1
10 CAPSULE ORAL EVERY MORNING
Qty: 30 CAPSULE | Refills: 0 | Status: SHIPPED | OUTPATIENT
Start: 2025-07-03

## 2025-08-04 ENCOUNTER — MYC REFILL (OUTPATIENT)
Dept: FAMILY MEDICINE | Facility: CLINIC | Age: 30
End: 2025-08-04
Payer: COMMERCIAL

## 2025-08-04 DIAGNOSIS — F90.2 ADHD (ATTENTION DEFICIT HYPERACTIVITY DISORDER), COMBINED TYPE: ICD-10-CM

## 2025-08-05 RX ORDER — LISDEXAMFETAMINE DIMESYLATE 20 MG/1
20 CAPSULE ORAL DAILY
Qty: 30 CAPSULE | Refills: 0 | Status: SHIPPED | OUTPATIENT
Start: 2025-08-05

## 2025-08-05 RX ORDER — LISDEXAMFETAMINE DIMESYLATE 10 MG/1
10 CAPSULE ORAL EVERY MORNING
Qty: 30 CAPSULE | Refills: 0 | Status: SHIPPED | OUTPATIENT
Start: 2025-08-05

## 2025-09-03 ENCOUNTER — MYC REFILL (OUTPATIENT)
Dept: FAMILY MEDICINE | Facility: CLINIC | Age: 30
End: 2025-09-03
Payer: COMMERCIAL

## 2025-09-03 DIAGNOSIS — F90.2 ADHD (ATTENTION DEFICIT HYPERACTIVITY DISORDER), COMBINED TYPE: ICD-10-CM

## 2025-09-03 RX ORDER — LISDEXAMFETAMINE DIMESYLATE 20 MG/1
20 CAPSULE ORAL DAILY
Qty: 30 CAPSULE | Refills: 0 | Status: CANCELLED | OUTPATIENT
Start: 2025-09-03

## (undated) DEVICE — SPLINT ORTHOPEDIC INTERMEDIATE SD900.105

## (undated) DEVICE — DRSG JAWBRA  95

## (undated) DEVICE — LINEN TOWEL PACK X6 WHITE 5487

## (undated) DEVICE — SYRINGE EAR/ULCER STERILE 2 OZ BULB 4172

## (undated) DEVICE — GLOVE BIOGEL PI MICRO SZ 6.5 48565

## (undated) DEVICE — SOL NACL 0.9% IRRIG 1000ML BOTTLE 2F7124

## (undated) DEVICE — PREP POVIDONE-IODINE 10% SOLUTION 4OZ BOTTLE MDS093944

## (undated) DEVICE — ESU NDL COLORADO MICRO E1651

## (undated) DEVICE — RX SURGIFLO HEMOSTATIC MATRIX W/THROMBIN 8ML 2994

## (undated) DEVICE — DRSG GAUZE 4X4" TRAY 6939

## (undated) DEVICE — PREP POVIDONE-IODINE 7.5% SCRUB 4OZ BOTTLE MDS093945

## (undated) DEVICE — SYR 10ML LL W/O NDL 302995

## (undated) DEVICE — SOL NACL 0.9% INJ 1000ML BAG 2B1324X

## (undated) DEVICE — SPONGE COTTONOID 1/2X3" 80-1407

## (undated) DEVICE — SU CHROMIC 3-0 FS-2 27" 636

## (undated) DEVICE — EYE SHIELD CORNEAL CROUCH  E5699

## (undated) DEVICE — TAPE CLOTH ADHESIVE 3" LATEX 3554C

## (undated) DEVICE — SU PLAIN FAST ABSORB 5-0 PC-1 18" 1915G

## (undated) DEVICE — SU PDS II 2-0 SH 27" Z317H

## (undated) DEVICE — SUCTION MANIFOLD NEPTUNE 2 SYS 4 PORT 0702-020-000

## (undated) DEVICE — ESU GROUND PAD ADULT W/CORD E7507

## (undated) DEVICE — PACK NEURO MINOR UMMC SNE32MNMU4

## (undated) DEVICE — SOL WATER IRRIG 1000ML BOTTLE 2F7114

## (undated) DEVICE — Device

## (undated) DEVICE — BUR STRK SIDE CUT 1.6X5.1X44.8MM CARBIDE 6FLUTE 1607-002-107

## (undated) DEVICE — GLOVE BIOGEL PI MICRO INDICATOR UNDERGLOVE SZ 7.0 48970

## (undated) DEVICE — PAD CHUX UNDERPAD 23X24" 7136

## (undated) DEVICE — BLADE CLIPPER SGL USE 9680

## (undated) DEVICE — WIPES FOLEY CARE SURESTEP PROVON DFC100

## (undated) DEVICE — LINEN TOWEL PACK X5 5464

## (undated) DEVICE — PREP SKIN SCRUB TRAY 4461A

## (undated) DEVICE — DRILL BIT SYN 1.8X100 TROCAR CALIBRATED QC 317.876

## (undated) DEVICE — TOOTHBRUSH ADULT NON STERILE MDS136850

## (undated) DEVICE — CATH TRAY FOLEY SURESTEP 16FR W/URNE MTR STLK LATEX A303316A

## (undated) DEVICE — BRUSH SURGICAL EZ SCRUB PLAIN 371603

## (undated) DEVICE — SU CHROMIC 3-0 SH 27" G122H

## (undated) RX ORDER — GABAPENTIN 300 MG/1
CAPSULE ORAL
Status: DISPENSED
Start: 2023-11-29

## (undated) RX ORDER — SCOLOPAMINE TRANSDERMAL SYSTEM 1 MG/1
PATCH, EXTENDED RELEASE TRANSDERMAL
Status: DISPENSED
Start: 2023-11-29

## (undated) RX ORDER — PROPOFOL 10 MG/ML
INJECTION, EMULSION INTRAVENOUS
Status: DISPENSED
Start: 2023-11-29

## (undated) RX ORDER — FENTANYL CITRATE-0.9 % NACL/PF 10 MCG/ML
PLASTIC BAG, INJECTION (ML) INTRAVENOUS
Status: DISPENSED
Start: 2023-11-29

## (undated) RX ORDER — FENTANYL CITRATE 50 UG/ML
INJECTION, SOLUTION INTRAMUSCULAR; INTRAVENOUS
Status: DISPENSED
Start: 2024-04-03

## (undated) RX ORDER — CHLORHEXIDINE GLUCONATE ORAL RINSE 1.2 MG/ML
SOLUTION DENTAL
Status: DISPENSED
Start: 2023-11-29

## (undated) RX ORDER — FENTANYL CITRATE 50 UG/ML
INJECTION, SOLUTION INTRAMUSCULAR; INTRAVENOUS
Status: DISPENSED
Start: 2023-11-29

## (undated) RX ORDER — CEFAZOLIN SODIUM 1 G/3ML
INJECTION, POWDER, FOR SOLUTION INTRAMUSCULAR; INTRAVENOUS
Status: DISPENSED
Start: 2023-11-29

## (undated) RX ORDER — BALANCED SALT SOLUTION 6.4; .75; .48; .3; 3.9; 1.7 MG/ML; MG/ML; MG/ML; MG/ML; MG/ML; MG/ML
SOLUTION OPHTHALMIC
Status: DISPENSED
Start: 2023-11-29

## (undated) RX ORDER — ACETAMINOPHEN 325 MG/1
TABLET ORAL
Status: DISPENSED
Start: 2023-11-29

## (undated) RX ORDER — HYDROMORPHONE HYDROCHLORIDE 1 MG/ML
INJECTION, SOLUTION INTRAMUSCULAR; INTRAVENOUS; SUBCUTANEOUS
Status: DISPENSED
Start: 2023-11-29

## (undated) RX ORDER — KETOROLAC TROMETHAMINE 30 MG/ML
INJECTION, SOLUTION INTRAMUSCULAR; INTRAVENOUS
Status: DISPENSED
Start: 2023-11-29

## (undated) RX ORDER — BUPIVACAINE HYDROCHLORIDE AND EPINEPHRINE 2.5; 5 MG/ML; UG/ML
INJECTION, SOLUTION EPIDURAL; INFILTRATION; INTRACAUDAL; PERINEURAL
Status: DISPENSED
Start: 2023-11-29

## (undated) RX ORDER — NEOSTIGMINE METHYLSULFATE 1 MG/ML
VIAL (ML) INJECTION
Status: DISPENSED
Start: 2023-11-29

## (undated) RX ORDER — ONDANSETRON 2 MG/ML
INJECTION INTRAMUSCULAR; INTRAVENOUS
Status: DISPENSED
Start: 2023-11-29

## (undated) RX ORDER — OXYMETAZOLINE HYDROCHLORIDE 0.05 G/100ML
SPRAY NASAL
Status: DISPENSED
Start: 2023-11-29

## (undated) RX ORDER — CEFAZOLIN SODIUM/WATER 2 G/20 ML
SYRINGE (ML) INTRAVENOUS
Status: DISPENSED
Start: 2023-11-29

## (undated) RX ORDER — DEXAMETHASONE SODIUM PHOSPHATE 4 MG/ML
INJECTION, SOLUTION INTRA-ARTICULAR; INTRALESIONAL; INTRAMUSCULAR; INTRAVENOUS; SOFT TISSUE
Status: DISPENSED
Start: 2023-11-29